# Patient Record
Sex: MALE | Race: WHITE | NOT HISPANIC OR LATINO | URBAN - METROPOLITAN AREA
[De-identification: names, ages, dates, MRNs, and addresses within clinical notes are randomized per-mention and may not be internally consistent; named-entity substitution may affect disease eponyms.]

---

## 2022-12-26 ENCOUNTER — INPATIENT (INPATIENT)
Facility: HOSPITAL | Age: 62
LOS: 2 days | Discharge: HOME | DRG: 866 | End: 2022-12-29
Attending: INTERNAL MEDICINE | Admitting: INTERNAL MEDICINE
Payer: COMMERCIAL

## 2022-12-26 VITALS
OXYGEN SATURATION: 96 % | RESPIRATION RATE: 22 BRPM | TEMPERATURE: 100 F | SYSTOLIC BLOOD PRESSURE: 134 MMHG | HEIGHT: 68 IN | DIASTOLIC BLOOD PRESSURE: 86 MMHG | WEIGHT: 167.99 LBS | HEART RATE: 112 BPM

## 2022-12-26 DIAGNOSIS — R50.9 FEVER, UNSPECIFIED: ICD-10-CM

## 2022-12-26 LAB
ALBUMIN SERPL ELPH-MCNC: 3.2 G/DL — LOW (ref 3.3–5)
ALP SERPL-CCNC: 190 U/L — HIGH (ref 30–120)
ALT FLD-CCNC: 155 U/L DA — HIGH (ref 10–60)
ANION GAP SERPL CALC-SCNC: 11 MMOL/L — SIGNIFICANT CHANGE UP (ref 5–17)
ANISOCYTOSIS BLD QL: SLIGHT — SIGNIFICANT CHANGE UP
APPEARANCE UR: CLEAR — SIGNIFICANT CHANGE UP
APTT BLD: 41.6 SEC — HIGH (ref 27.5–35.5)
AST SERPL-CCNC: 68 U/L — HIGH (ref 10–40)
BACTERIA # UR AUTO: ABNORMAL
BASOPHILS # BLD AUTO: 0.09 K/UL — SIGNIFICANT CHANGE UP (ref 0–0.2)
BASOPHILS NFR BLD AUTO: 1 % — SIGNIFICANT CHANGE UP (ref 0–2)
BILIRUB SERPL-MCNC: 0.6 MG/DL — SIGNIFICANT CHANGE UP (ref 0.2–1.2)
BILIRUB UR-MCNC: NEGATIVE — SIGNIFICANT CHANGE UP
BUN SERPL-MCNC: 24 MG/DL — HIGH (ref 7–23)
CALCIUM SERPL-MCNC: 8.6 MG/DL — SIGNIFICANT CHANGE UP (ref 8.4–10.5)
CHLORIDE SERPL-SCNC: 100 MMOL/L — SIGNIFICANT CHANGE UP (ref 96–108)
CO2 SERPL-SCNC: 26 MMOL/L — SIGNIFICANT CHANGE UP (ref 22–31)
COLOR SPEC: YELLOW — SIGNIFICANT CHANGE UP
CREAT SERPL-MCNC: 1.39 MG/DL — HIGH (ref 0.5–1.3)
DIFF PNL FLD: NEGATIVE — SIGNIFICANT CHANGE UP
EGFR: 57 ML/MIN/1.73M2 — LOW
EOSINOPHIL # BLD AUTO: 1.3 K/UL — HIGH (ref 0–0.5)
EOSINOPHIL NFR BLD AUTO: 14 % — HIGH (ref 0–6)
EPI CELLS # UR: SIGNIFICANT CHANGE UP
FLUAV AG NPH QL: SIGNIFICANT CHANGE UP
FLUBV AG NPH QL: SIGNIFICANT CHANGE UP
GLUCOSE SERPL-MCNC: 108 MG/DL — HIGH (ref 70–99)
GLUCOSE UR QL: NEGATIVE MG/DL — SIGNIFICANT CHANGE UP
HCT VFR BLD CALC: 39.8 % — SIGNIFICANT CHANGE UP (ref 39–50)
HGB BLD-MCNC: 13.2 G/DL — SIGNIFICANT CHANGE UP (ref 13–17)
INR BLD: 1.42 RATIO — HIGH (ref 0.88–1.16)
KETONES UR-MCNC: ABNORMAL
LACTATE SERPL-SCNC: 1.7 MMOL/L — SIGNIFICANT CHANGE UP (ref 0.7–2)
LEUKOCYTE ESTERASE UR-ACNC: ABNORMAL
LYMPHOCYTES # BLD AUTO: 0.46 K/UL — LOW (ref 1–3.3)
LYMPHOCYTES # BLD AUTO: 5 % — LOW (ref 13–44)
MANUAL SMEAR VERIFICATION: SIGNIFICANT CHANGE UP
MCHC RBC-ENTMCNC: 28 PG — SIGNIFICANT CHANGE UP (ref 27–34)
MCHC RBC-ENTMCNC: 33.2 GM/DL — SIGNIFICANT CHANGE UP (ref 32–36)
MCV RBC AUTO: 84.5 FL — SIGNIFICANT CHANGE UP (ref 80–100)
MONOCYTES # BLD AUTO: 0.46 K/UL — SIGNIFICANT CHANGE UP (ref 0–0.9)
MONOCYTES NFR BLD AUTO: 5 % — SIGNIFICANT CHANGE UP (ref 2–14)
NEUTROPHILS # BLD AUTO: 5.01 K/UL — SIGNIFICANT CHANGE UP (ref 1.8–7.4)
NEUTROPHILS NFR BLD AUTO: 51 % — SIGNIFICANT CHANGE UP (ref 43–77)
NEUTS BAND # BLD: 3 % — SIGNIFICANT CHANGE UP (ref 0–8)
NITRITE UR-MCNC: NEGATIVE — SIGNIFICANT CHANGE UP
NRBC # BLD: 0 — SIGNIFICANT CHANGE UP
NRBC # BLD: SIGNIFICANT CHANGE UP /100 WBCS (ref 0–0)
PH UR: 5 — SIGNIFICANT CHANGE UP (ref 5–8)
PLAT MORPH BLD: NORMAL — SIGNIFICANT CHANGE UP
PLATELET # BLD AUTO: 145 K/UL — LOW (ref 150–400)
POTASSIUM SERPL-MCNC: 4.5 MMOL/L — SIGNIFICANT CHANGE UP (ref 3.5–5.3)
POTASSIUM SERPL-SCNC: 4.5 MMOL/L — SIGNIFICANT CHANGE UP (ref 3.5–5.3)
PROT SERPL-MCNC: 7.7 G/DL — SIGNIFICANT CHANGE UP (ref 6–8.3)
PROT UR-MCNC: 30 MG/DL
PROTHROM AB SERPL-ACNC: 16.8 SEC — HIGH (ref 10.5–13.4)
RBC # BLD: 4.71 M/UL — SIGNIFICANT CHANGE UP (ref 4.2–5.8)
RBC # FLD: 15.9 % — HIGH (ref 10.3–14.5)
RBC BLD AUTO: ABNORMAL
RBC CASTS # UR COMP ASSIST: NEGATIVE /HPF — SIGNIFICANT CHANGE UP (ref 0–4)
RSV RNA NPH QL NAA+NON-PROBE: SIGNIFICANT CHANGE UP
SARS-COV-2 RNA SPEC QL NAA+PROBE: SIGNIFICANT CHANGE UP
SMUDGE CELLS # BLD: PRESENT — SIGNIFICANT CHANGE UP
SODIUM SERPL-SCNC: 137 MMOL/L — SIGNIFICANT CHANGE UP (ref 135–145)
SP GR SPEC: 1.02 — SIGNIFICANT CHANGE UP (ref 1.01–1.02)
UROBILINOGEN FLD QL: NEGATIVE MG/DL — SIGNIFICANT CHANGE UP
VARIANT LYMPHS # BLD: 21 % — HIGH (ref 0–6)
WBC # BLD: 9.27 K/UL — SIGNIFICANT CHANGE UP (ref 3.8–10.5)
WBC # FLD AUTO: 9.27 K/UL — SIGNIFICANT CHANGE UP (ref 3.8–10.5)
WBC UR QL: SIGNIFICANT CHANGE UP

## 2022-12-26 PROCEDURE — 99223 1ST HOSP IP/OBS HIGH 75: CPT

## 2022-12-26 PROCEDURE — 99285 EMERGENCY DEPT VISIT HI MDM: CPT

## 2022-12-26 PROCEDURE — 71260 CT THORAX DX C+: CPT | Mod: 26,MA

## 2022-12-26 PROCEDURE — 71045 X-RAY EXAM CHEST 1 VIEW: CPT | Mod: 26

## 2022-12-26 PROCEDURE — 76700 US EXAM ABDOM COMPLETE: CPT | Mod: 26

## 2022-12-26 PROCEDURE — 74177 CT ABD & PELVIS W/CONTRAST: CPT | Mod: 26,MA

## 2022-12-26 RX ORDER — IBUPROFEN 200 MG
400 TABLET ORAL ONCE
Refills: 0 | Status: COMPLETED | OUTPATIENT
Start: 2022-12-26 | End: 2022-12-26

## 2022-12-26 RX ORDER — SODIUM CHLORIDE 9 MG/ML
2400 INJECTION INTRAMUSCULAR; INTRAVENOUS; SUBCUTANEOUS ONCE
Refills: 0 | Status: COMPLETED | OUTPATIENT
Start: 2022-12-26 | End: 2022-12-26

## 2022-12-26 RX ORDER — IPRATROPIUM/ALBUTEROL SULFATE 18-103MCG
3 AEROSOL WITH ADAPTER (GRAM) INHALATION ONCE
Refills: 0 | Status: COMPLETED | OUTPATIENT
Start: 2022-12-26 | End: 2022-12-26

## 2022-12-26 RX ORDER — PANTOPRAZOLE SODIUM 20 MG/1
40 TABLET, DELAYED RELEASE ORAL
Refills: 0 | Status: DISCONTINUED | OUTPATIENT
Start: 2022-12-26 | End: 2022-12-29

## 2022-12-26 RX ORDER — INFLUENZA VIRUS VACCINE 15; 15; 15; 15 UG/.5ML; UG/.5ML; UG/.5ML; UG/.5ML
0.5 SUSPENSION INTRAMUSCULAR ONCE
Refills: 0 | Status: DISCONTINUED | OUTPATIENT
Start: 2022-12-26 | End: 2022-12-29

## 2022-12-26 RX ORDER — SODIUM CHLORIDE 9 MG/ML
1000 INJECTION INTRAMUSCULAR; INTRAVENOUS; SUBCUTANEOUS
Refills: 0 | Status: DISCONTINUED | OUTPATIENT
Start: 2022-12-26 | End: 2022-12-29

## 2022-12-26 RX ORDER — ATORVASTATIN CALCIUM 80 MG/1
1 TABLET, FILM COATED ORAL
Qty: 0 | Refills: 0 | DISCHARGE

## 2022-12-26 RX ORDER — LOSARTAN POTASSIUM 100 MG/1
50 TABLET, FILM COATED ORAL DAILY
Refills: 0 | Status: DISCONTINUED | OUTPATIENT
Start: 2022-12-26 | End: 2022-12-29

## 2022-12-26 RX ORDER — LOSARTAN POTASSIUM 100 MG/1
1 TABLET, FILM COATED ORAL
Qty: 0 | Refills: 0 | DISCHARGE

## 2022-12-26 RX ORDER — ONDANSETRON 8 MG/1
4 TABLET, FILM COATED ORAL ONCE
Refills: 0 | Status: COMPLETED | OUTPATIENT
Start: 2022-12-26 | End: 2022-12-26

## 2022-12-26 RX ORDER — IBUPROFEN 200 MG
400 TABLET ORAL EVERY 8 HOURS
Refills: 0 | Status: DISCONTINUED | OUTPATIENT
Start: 2022-12-26 | End: 2022-12-26

## 2022-12-26 RX ADMIN — Medication 400 MILLIGRAM(S): at 20:30

## 2022-12-26 RX ADMIN — SODIUM CHLORIDE 2400 MILLILITER(S): 9 INJECTION INTRAMUSCULAR; INTRAVENOUS; SUBCUTANEOUS at 12:20

## 2022-12-26 RX ADMIN — Medication 400 MILLIGRAM(S): at 12:20

## 2022-12-26 RX ADMIN — SODIUM CHLORIDE 75 MILLILITER(S): 9 INJECTION INTRAMUSCULAR; INTRAVENOUS; SUBCUTANEOUS at 22:35

## 2022-12-26 RX ADMIN — SODIUM CHLORIDE 2400 MILLILITER(S): 9 INJECTION INTRAMUSCULAR; INTRAVENOUS; SUBCUTANEOUS at 11:22

## 2022-12-26 RX ADMIN — Medication 400 MILLIGRAM(S): at 19:30

## 2022-12-26 RX ADMIN — Medication 3 MILLILITER(S): at 12:03

## 2022-12-26 RX ADMIN — ONDANSETRON 4 MILLIGRAM(S): 8 TABLET, FILM COATED ORAL at 11:22

## 2022-12-26 RX ADMIN — Medication 125 MILLIGRAM(S): at 11:37

## 2022-12-26 RX ADMIN — Medication 400 MILLIGRAM(S): at 11:22

## 2022-12-26 NOTE — PATIENT PROFILE ADULT - FALL HARM RISK - HARM RISK INTERVENTIONS

## 2022-12-26 NOTE — H&P ADULT - HISTORY OF PRESENT ILLNESS
62 yr old with PMH of HTN ,h/o appendectomy presenting with fevers, body aches, diarrhea, hasf 6 days of fever (T-max 102) headache body aches coughing status post trip to [place].  Patient relates went to urgent care 3 days ago had a negative flu and COVID rapid test but was given Tamiflu anyway in case he still had the flu despite the negative test.  Patient developed nausea and some vomiting after starting Tamiflu he called his doctor yesterday they started him on doxycycline in case he had a bacterial infection.  No sore throat runny nose abdominal pain chest pain short of breath urinary complaints rash neck stiffness or photophobia. Last Tylenol approximately 1.5 hours prior to arrival  .PMD Attard   In ED febrile, with tachycadia, tachypnea, no leucocytosis, has lymphopenia,elevated LFT, had US abd -diffuse GB edema and periportal adenopathy,seen by surg- has elevated bun creat , had CT abd chest-< from: CT Chest w/ IV Cont (12.26.22 @ 17:01) >  Diffuse thoracic and abdominopelvic lymphadenopathy. Mild splenomegaly.   Consideration is given to a infectious/inflammatory process versus a   neoplastic process such as lymphoproliferative disease.  Mild hepatomegaly. Periportal edema, a nonspecific finding that may be   seen with hepatitis or aggressive fluid resuscitation. Correlate with   liver function tests.  Underdistended gallbladder. Edematous gallbladder wall thickening, also   nonspecific and may be related to underlying liver pathology.  Gastric wall thickening versus underdistention, possibly representing   gastritis. Consider correlation with upper endoscopy to assess for   underlying neoplasm.  A few small nonspecific pulmonary nodules. Continued follow-up is   recommended.  Small right and trace left pleural effusions.    Small volume ascites.    Being admitted for further management  ID GI , hemeonc and pulmonary opinion sought

## 2022-12-26 NOTE — ED PROVIDER NOTE - LAB INTERPRETATION
Flu With COVID-19 By ODESSA (12.26.22 @ 11:34)   SARS-CoV-2 Result: NotDetec: This Respiratory Panel uses polymerase chain reaction (PCR) to detect for   influenza A; influenza B; respiratory syncytial virus; and SARS-CoV-2.   Influenza A Result: NotDetec   Influenza B Result: NotDetec   Resp Syn Virus Result: NotDetec

## 2022-12-26 NOTE — ED PROVIDER NOTE - PROGRESS NOTE DETAILS
d/w oz (surg) she requests CT and she will see pt d/w shaheen (med) she will admit pt, she requests bre (gi) and marguerite (heme/onc) consults be called. d/w bre and marguerite, they will follow pt

## 2022-12-26 NOTE — ED PROVIDER NOTE - CLINICAL SUMMARY MEDICAL DECISION MAKING FREE TEXT BOX
Patient complaining of 6 days of fever (T-max 102) headache body aches coughing status post trip to [place].  Patient relates went to urgent care 3 days ago had a negative flu and COVID rapid test but was given Tamiflu anyway in case he still had the flu despite the negative test.  Patient developed nausea and some vomiting after starting Tamiflu he called his doctor yesterday they started him on doxycycline in case he had a bacterial infection.  No sore throat runny nose abdominal pain chest pain short of breath urinary complaints rash neck stiffness or photophobia.    Plan sepsis work-up including EKG chest x-ray labs lactate cultures IV fluid Motrin for fever Zofran for nausea duo nebs and Solu-Medrol for wheezing  Differential including Flu COVID RSV pneumonia sepsis

## 2022-12-26 NOTE — ED PROVIDER NOTE - OBJECTIVE STATEMENT
Patient complaining of 6 days of fever (T-max 102) headache body aches coughing status post trip to [place].  Patient relates went to urgent care 3 days ago had a negative flu and COVID rapid test but was given Tamiflu anyway in case he still had the flu despite the negative test.  Patient developed nausea and some vomiting after starting Tamiflu he called his doctor yesterday they started him on doxycycline in case he had a bacterial infection.  No sore throat runny nose abdominal pain chest pain short of breath urinary complaints rash neck stiffness or photophobia. Last Tylenol approximately 1.5 hours prior to arrival.  PMD Attard

## 2022-12-26 NOTE — ED ADULT NURSE NOTE - OBJECTIVE STATEMENT
63yo male walked into Ed, pt c/o "lisbeth had fevers for a few days now" as per pt. pt advised staff he visited family in [place] on Thursday 12/22/22 when symptoms began. pt denies SOB, pain.

## 2022-12-26 NOTE — ED ADULT TRIAGE NOTE - CHIEF COMPLAINT QUOTE
fever and headache came home from [place] after seeing grand kids. seen at urgent care and covid and flu negative but started on tamiflu and then called covering md yesterday and started on doxycycline over phone

## 2022-12-26 NOTE — ED PROVIDER NOTE - ENMT, MLM
Airway patent,Mouth with normal mucosa. Throat has no vesicles, no oropharyngeal exudates and uvula is midline. no nuchal rigidity

## 2022-12-27 LAB
ALBUMIN SERPL ELPH-MCNC: 2.7 G/DL — LOW (ref 3.3–5)
ALP SERPL-CCNC: 150 U/L — HIGH (ref 30–120)
ALT FLD-CCNC: 112 U/L DA — HIGH (ref 10–60)
ANION GAP SERPL CALC-SCNC: 6 MMOL/L — SIGNIFICANT CHANGE UP (ref 5–17)
AST SERPL-CCNC: 37 U/L — SIGNIFICANT CHANGE UP (ref 10–40)
BASOPHILS # BLD AUTO: 0.08 K/UL — SIGNIFICANT CHANGE UP (ref 0–0.2)
BASOPHILS NFR BLD AUTO: 0.7 % — SIGNIFICANT CHANGE UP (ref 0–2)
BILIRUB SERPL-MCNC: 0.3 MG/DL — SIGNIFICANT CHANGE UP (ref 0.2–1.2)
BUN SERPL-MCNC: 26 MG/DL — HIGH (ref 7–23)
CALCIUM SERPL-MCNC: 8.3 MG/DL — LOW (ref 8.4–10.5)
CEA SERPL-MCNC: <0.6 NG/ML — SIGNIFICANT CHANGE UP (ref 0–3.8)
CHLORIDE SERPL-SCNC: 106 MMOL/L — SIGNIFICANT CHANGE UP (ref 96–108)
CO2 SERPL-SCNC: 27 MMOL/L — SIGNIFICANT CHANGE UP (ref 22–31)
CREAT SERPL-MCNC: 0.89 MG/DL — SIGNIFICANT CHANGE UP (ref 0.5–1.3)
CRP SERPL-MCNC: 109 MG/L — HIGH
EGFR: 97 ML/MIN/1.73M2 — SIGNIFICANT CHANGE UP
EOSINOPHIL # BLD AUTO: 0.04 K/UL — SIGNIFICANT CHANGE UP (ref 0–0.5)
EOSINOPHIL NFR BLD AUTO: 0.4 % — SIGNIFICANT CHANGE UP (ref 0–6)
ERYTHROCYTE [SEDIMENTATION RATE] IN BLOOD: 74 MM/HR — HIGH (ref 0–9)
GLUCOSE SERPL-MCNC: 132 MG/DL — HIGH (ref 70–99)
HCT VFR BLD CALC: 34 % — LOW (ref 39–50)
HGB BLD-MCNC: 11.4 G/DL — LOW (ref 13–17)
IMM GRANULOCYTES NFR BLD AUTO: 0.5 % — SIGNIFICANT CHANGE UP (ref 0–0.9)
LDH SERPL L TO P-CCNC: 197 U/L — SIGNIFICANT CHANGE UP (ref 50–242)
LYMPHOCYTES # BLD AUTO: 2.39 K/UL — SIGNIFICANT CHANGE UP (ref 1–3.3)
LYMPHOCYTES # BLD AUTO: 21.8 % — SIGNIFICANT CHANGE UP (ref 13–44)
MCHC RBC-ENTMCNC: 28.3 PG — SIGNIFICANT CHANGE UP (ref 27–34)
MCHC RBC-ENTMCNC: 33.5 GM/DL — SIGNIFICANT CHANGE UP (ref 32–36)
MCV RBC AUTO: 84.4 FL — SIGNIFICANT CHANGE UP (ref 80–100)
MONOCYTES # BLD AUTO: 1.38 K/UL — HIGH (ref 0–0.9)
MONOCYTES NFR BLD AUTO: 12.6 % — SIGNIFICANT CHANGE UP (ref 2–14)
NEUTROPHILS # BLD AUTO: 7 K/UL — SIGNIFICANT CHANGE UP (ref 1.8–7.4)
NEUTROPHILS NFR BLD AUTO: 64 % — SIGNIFICANT CHANGE UP (ref 43–77)
NRBC # BLD: 0 /100 WBCS — SIGNIFICANT CHANGE UP (ref 0–0)
PLATELET # BLD AUTO: 167 K/UL — SIGNIFICANT CHANGE UP (ref 150–400)
POTASSIUM SERPL-MCNC: 4.8 MMOL/L — SIGNIFICANT CHANGE UP (ref 3.5–5.3)
POTASSIUM SERPL-SCNC: 4.8 MMOL/L — SIGNIFICANT CHANGE UP (ref 3.5–5.3)
PROT SERPL-MCNC: 7.1 G/DL — SIGNIFICANT CHANGE UP (ref 6–8.3)
RBC # BLD: 4.03 M/UL — LOW (ref 4.2–5.8)
RBC # FLD: 16.2 % — HIGH (ref 10.3–14.5)
SODIUM SERPL-SCNC: 139 MMOL/L — SIGNIFICANT CHANGE UP (ref 135–145)
T3 SERPL-MCNC: 84 NG/DL — SIGNIFICANT CHANGE UP (ref 80–200)
T4 AB SER-ACNC: 5.3 UG/DL — SIGNIFICANT CHANGE UP (ref 4.6–12)
TSH SERPL-MCNC: 2.67 UIU/ML — SIGNIFICANT CHANGE UP (ref 0.27–4.2)
WBC # BLD: 10.95 K/UL — HIGH (ref 3.8–10.5)
WBC # FLD AUTO: 10.95 K/UL — HIGH (ref 3.8–10.5)

## 2022-12-27 PROCEDURE — 99233 SBSQ HOSP IP/OBS HIGH 50: CPT

## 2022-12-27 RX ORDER — ACETAMINOPHEN 500 MG
650 TABLET ORAL EVERY 6 HOURS
Refills: 0 | Status: DISCONTINUED | OUTPATIENT
Start: 2022-12-27 | End: 2022-12-29

## 2022-12-27 RX ORDER — LANOLIN ALCOHOL/MO/W.PET/CERES
5 CREAM (GRAM) TOPICAL AT BEDTIME
Refills: 0 | Status: DISCONTINUED | OUTPATIENT
Start: 2022-12-27 | End: 2022-12-29

## 2022-12-27 RX ORDER — HEPARIN SODIUM 5000 [USP'U]/ML
5000 INJECTION INTRAVENOUS; SUBCUTANEOUS EVERY 12 HOURS
Refills: 0 | Status: DISCONTINUED | OUTPATIENT
Start: 2022-12-27 | End: 2022-12-29

## 2022-12-27 RX ORDER — IBUPROFEN 200 MG
400 TABLET ORAL EVERY 8 HOURS
Refills: 0 | Status: DISCONTINUED | OUTPATIENT
Start: 2022-12-27 | End: 2022-12-29

## 2022-12-27 RX ADMIN — Medication 650 MILLIGRAM(S): at 23:15

## 2022-12-27 RX ADMIN — Medication 650 MILLIGRAM(S): at 15:28

## 2022-12-27 RX ADMIN — PANTOPRAZOLE SODIUM 40 MILLIGRAM(S): 20 TABLET, DELAYED RELEASE ORAL at 06:58

## 2022-12-27 RX ADMIN — Medication 400 MILLIGRAM(S): at 08:36

## 2022-12-27 RX ADMIN — HEPARIN SODIUM 5000 UNIT(S): 5000 INJECTION INTRAVENOUS; SUBCUTANEOUS at 18:03

## 2022-12-27 RX ADMIN — Medication 400 MILLIGRAM(S): at 23:15

## 2022-12-27 RX ADMIN — Medication 100 MILLIGRAM(S): at 20:36

## 2022-12-27 RX ADMIN — Medication 400 MILLIGRAM(S): at 09:25

## 2022-12-27 RX ADMIN — Medication 650 MILLIGRAM(S): at 15:04

## 2022-12-27 RX ADMIN — SODIUM CHLORIDE 75 MILLILITER(S): 9 INJECTION INTRAMUSCULAR; INTRAVENOUS; SUBCUTANEOUS at 12:34

## 2022-12-27 NOTE — PROGRESS NOTE ADULT - NS ATTEND AMEND GEN_ALL_CORE FT
I have personally seen and examined the patient.  I fully participated in the care of this patient.  I have made amendments to the documentation where necessary, and agree with the history, physical exam, impression/assessment, and plan as documented by the PA    consulted by ED for gallbladder wall edema, which is a nonspecific finding. Patient also has periportal edema  no clinical concern for acute gallbladder pathology, more likely GI infection  consider hepatitis workup   stool studies  GI and ID f/u

## 2022-12-27 NOTE — CONSULT NOTE ADULT - SUBJECTIVE AND OBJECTIVE BOX
General Surgery Consult    HPI:  62M with HTN, HLD, h/o appendectomy presenting with fevers, body aches, diarrhea. Patient reports he was in [place] last week visiting family. Went to a restaurant and ordered fish. That night developed diffuse body rash that dissipated. No h/o fish allergy but tried a new type of fish. The next day he began having fevers have been  persistent Tmax 102 at home. Went to urgent case, negative for flu or covid but was given Tamiflu incase. Called his pcp when it continued and was called in doxycycline yesterday. Has been having watery diarrhea for 2 days (prior to starting abx), no blood per patient. No RUQ pain, nausea/vomiting    In ED labs were noted to have elevated LFTs so RUQ US was ordered which showed no gallstones but GB wall edema. Covid / flu negative.    h/o colonoscopy 10 yrs ago reportedly normal. Per patient he has a history of elevated LFTs in the past       PAST MEDICAL HISTORY:  HTN  HLD    PAST SURGICAL HISTORY:   appendectomy    MEDICATIONS:  losartan  atorvastatin  Tamiflu  doxycyline     ALLERGIES:  Vicodin (Hives)      VITALS & I/Os:  Vital Signs Last 24 Hrs  T(C): 38.9 (26 Dec 2022 12:20), Max: 39.4 (26 Dec 2022 11:55)  T(F): 102 (26 Dec 2022 12:20), Max: 103 (26 Dec 2022 11:55)  HR: 105 (26 Dec 2022 12:20) (95 - 112)  BP: 119/85 (26 Dec 2022 12:20) (119/85 - 134/86)  BP(mean): --  RR: 16 (26 Dec 2022 12:20) (16 - 22)  SpO2: 96% (26 Dec 2022 12:20) (96% - 97%)    Parameters below as of 26 Dec 2022 12:20  Patient On (Oxygen Delivery Method): room air        I&O's Summary      PHYSICAL EXAM:  Constitutional: No acute distress, resting comfortably  Neuro: Awake and alert  Psych: Calm, affect appropriate  HEENT: Anicteric, no conjunctival injection  Respiratory: Nonlabored, equal chest rise  Cardiovascular: Regular rate and rhythm  GI: Soft, +distended, nontender, no RUQ tenderness  Musculoskeletal: Warm, no edema, no obvious deformity      LABS:                        13.2   9.27  )-----------( 145      ( 26 Dec 2022 11:34 )             39.8         137  |  100  |  24<H>  ----------------------------<  108<H>  4.5   |  26  |  1.39<H>    Ca    8.6      26 Dec 2022 11:34    TPro  7.7  /  Alb  3.2<L>  /  TBili  0.6  /  DBili  x   /  AST  68<H>  /  ALT  155<H>  /  AlkPhos  190<H>      Lactate: Lactate, Blood: 1.7 mmol/L ( @ 11:34)     PT/INR - ( 26 Dec 2022 11:34 )   PT: 16.8 sec;   INR: 1.42 ratio         PTT - ( 26 Dec 2022 11:34 )  PTT:41.6 sec          Urinalysis Basic - ( 26 Dec 2022 13:20 )    Color: Yellow / Appearance: Clear / S.020 / pH: x  Gluc: x / Ketone: Moderate  / Bili: Negative / Urobili: Negative mg/dL   Blood: x / Protein: 30 mg/dL / Nitrite: Negative   Leuk Esterase: Trace / RBC: Negative /HPF / WBC 0-2   Sq Epi: x / Non Sq Epi: Occasional / Bacteria: Few        IMAGING:  c< from: US Abdomen Complete (US Abdomen Complete .) (22 @ 14:17) >    ACC: [id number] EXAM:  US ABDOMEN COMPLETE                          PROCEDURE DATE:  2022          INTERPRETATION:  CLINICAL INFORMATION: Abnormal liver function tests.    COMPARISON: None available.    TECHNIQUE: Sonography of the abdomen.    FINDINGS:  Liver: Normal in size and echogenicity. No focal lesions are identified.  Bile ducts: Normal caliber. Common bile duct measures .  Gallbladder: Diffuse gallbladder wall edema measuring up to 9 mm. No   stones are identified. There is no sonographic Hardin sign. Findings are   nonspecific.  Pancreas: There is 6 mm cyst in the region of the body of the pancreas.   The rest of the visualized portion of the pancreas is unremarkable.  Spleen: 9.8 cm. Within normal limits.  Right kidney: 9.6 cm. No hydronephrosis.  Left kidney: 10.1 cm.. No hydronephrosis.  Ascites: Trace amount of perihepatic fluid.  Aorta and IVC: Visualized portions are within normal limits.    Trace right pleural effusion.    There are multiple enlarged periportal lymph nodes measuring up to 4.5 x   1.9 cm.    IMPRESSION:  Diffuse gallbladder wall edema which is a nonspecific finding.    Periportal adenopathy.    Subcentimeter pancreatic body cyst.    Consider further evaluation with contrast enhanced dual phase abdominal   and pelvic CT.        --- End of Report ---            JOSELITO ROBERTS MD; Attending Radiologist  This document has been electronically signed. Dec 26 2022  3:22PM    < end of copied text >  
62M with HTN, HLD, h/o appendectomy presenting with fevers, body aches, diarrhea. Patient reports he was in [place] last week visiting family. Went to a restaurant and ordered fish. That night developed diffuse body rash that dissipated. No h/o fish allergy but tried a new type of fish. The next day he began having fevers have been  persistent Tmax 102 at home. Went to urgent case, negative for flu or covid but was given Tamiflu incase. Called his pcp when it continued and was called in doxycycline yesterday. Has been having watery diarrhea for 2 days (prior to starting abx), no blood per patient. No RUQ pain, nausea/vomiting    In ED labs were noted to have elevated LFTs so RUQ US was ordered which showed no gallstones but GB wall edema. Covid / flu negative.    h/o colonoscopy 10 yrs ago reportedly normal. Per patient he has a history of elevated LFTs in the past       PAST MEDICAL HISTORY:  HTN  HLD    PAST SURGICAL HISTORY:   appendectomy    MEDICATIONS:  losartan  atorvastatin  Tamiflu  doxycyline   VITALS & I/Os:  Vital Signs Last 24 Hrs  T(C): 38.9 (26 Dec 2022 12:20), Max: 39.4 (26 Dec 2022 11:55)  T(F): 102 (26 Dec 2022 12:20), Max: 103 (26 Dec 2022 11:55)  HR: 105 (26 Dec 2022 12:20) (95 - 112)  BP: 119/85 (26 Dec 2022 12:20) (119/85 - 134/86)  BP(mean): --  RR: 16 (26 Dec 2022 12:20) (16 - 22)  SpO2: 96% (26 Dec 2022 12:20) (96% - 97%)    Parameters below as of 26 Dec 2022 12:20  Patient On (Oxygen Delivery Method): room air        I&O's Summary      PHYSICAL EXAM:  Constitutional: No acute distress, resting comfortably  Neuro: Awake and alert  Psych: Calm, affect appropriate  HEENT: Anicteric, no conjunctival injection  Respiratory: Nonlabored, equal chest rise  Cardiovascular: Regular rate and rhythm  GI: Soft, +distended, nontender, no RUQ tenderness  Musculoskeletal: Warm, no edema, no obvious deformity      LABS:                        13.2   9.27  )-----------( 145      ( 26 Dec 2022 11:34 )             39.8         137  |  100  |  24<H>  ----------------------------<  108<H>  4.5   |  26  |  1.39<H>    Ca    8.6      26 Dec 2022 11:34    TPro  7.7  /  Alb  3.2<L>  /  TBili  0.6  /  DBili  x   /  AST  68<H>  /  ALT  155<H>  /  AlkPhos  190<H>      Lactate: Lactate, Blood: 1.7 mmol/L ( @ 11:34)     PT/INR - ( 26 Dec 2022 11:34 )   PT: 16.8 sec;   INR: 1.42 ratio         PTT - ( 26 Dec 2022 11:34 )  PTT:41.6 sec          Urinalysis Basic - ( 26 Dec 2022 13:20 )    Color: Yellow / Appearance: Clear / S.020 / pH: x  Gluc: x / Ketone: Moderate  / Bili: Negative / Urobili: Negative mg/dL   Blood: x / Protein: 30 mg/dL / Nitrite: Negative   Leuk Esterase: Trace / RBC: Negative /HPF / WBC 0-2   Sq Epi: x / Non Sq Epi: Occasional / Bacteria: Few        IMAGING:  c< from: US Abdomen Complete (US Abdomen Complete .) (22 @ 14:17) >    ACC: [id number] EXAM:  US ABDOMEN COMPLETE                          PROCEDURE DATE:  2022          INTERPRETATION:  CLINICAL INFORMATION: Abnormal liver function tests.    COMPARISON: None available.    TECHNIQUE: Sonography of the abdomen.    FINDINGS:  Liver: Normal in size and echogenicity. No focal lesions are identified.  Bile ducts: Normal caliber. Common bile duct measures .  Gallbladder: Diffuse gallbladder wall edema measuring up to 9 mm. No   stones are identified. There is no sonographic Hardin sign. Findings are   nonspecific.  Pancreas: There is 6 mm cyst in the region of the body of the pancreas.   The rest of the visualized portion of the pancreas is unremarkable.  Spleen: 9.8 cm. Within normal limits.  Right kidney: 9.6 cm. No hydronephrosis.  Left kidney: 10.1 cm.. No hydronephrosis.  Ascites: Trace amount of perihepatic fluid.
NEPHROLOGY CONSULTATION    CHIEF COMPLAINT:  CLAYTON    HPI:  Presents with several days of high fever, headache, cough, and transient diarrhea.  He had mild azotemia yesterday which quickly resolved with hydration.  He denies any hx of CKD and has HTN.        ROS:  denies hematuria, voiding problems    PAST MEDICAL & SURGICAL HISTORY:  HTN  HPL  Appendectomy      SOCIAL HISTORY:  NA    FAMILY HISTORY:  NA      MEDICATIONS  (STANDING):  influenza   Vaccine 0.5 milliLiter(s) IntraMuscular once  losartan 50 milliGRAM(s) Oral daily  pantoprazole    Tablet 40 milliGRAM(s) Oral before breakfast  sodium chloride 0.9%. 1000 milliLiter(s) (75 mL/Hr) IV Continuous <Continuous>      PHYSICAL EXAMINATION:  T(F): 97.6 (22 @ 11:59)  HR: 74 (22 @ 11:59)  BP: 100/57 (22 @ 11:59)  RR: 17 (22 @ 11:59)  SpO2: 94% (22 @ 11:59)  Conversant, no apparent distress  PERRLA, pink conjunctivae, no ptosis  Good dentition, no pharyngeal erythema  Neck non tender, no mass, no thyromegaly or nodules  Normal respiratory effort, lungs clear to auscultation  Heart with RRR, no murmurs or rubs, no peripheral edema  Abdomen soft, no masses, no organomegaly  Skin no rashes, ulcers or lesions, normal turgor and temperature  Appropriate affect, AO x 3    LABS:                        11.4   10.95 )-----------( 167      ( 27 Dec 2022 07:33 )             34.0         139  |  106  |  26<H>  ----------------------------<  132<H>  4.8   |  27  |  0.89    Ca    8.3<L>      27 Dec 2022 07:33    TPro  7.1  /  Alb  2.7<L>  /  TBili  0.3  /  DBili  x   /  AST  37  /  ALT  112<H>  /  AlkPhos  150<H>      Urinalysis Basic - ( 26 Dec 2022 13:20 )  Color: Yellow / Appearance: Clear / S.020 / pH: x  Gluc: x / Ketone: Moderate  / Bili: Negative / Urobili: Negative mg/dL   Blood: x / Protein: 30 mg/dL / Nitrite: Negative   Leuk Esterase: Trace / RBC: Negative /HPF / WBC 0-2   Sq Epi: x / Non Sq Epi: Occasional / Bacteria: Few        RADIOLOGY:  CT scan shows thoraco abdominal adenopathy, hepatosplenomegaly;  kidneys look normal       ASSESSMENT:  1.  CLAYTON - prerenal azotemia - improved  2.  Fever of unknown origin in association with diffuse adenopathy must rule out lymphoma    PLAN:  Continue IVF today  BMP in AM            
All records reviewed.  PMD Dr Farzad Mendez    HPI:  61 y/o man with PMH of HTN ,h/o appendectomy. Recently visited family in [place], reports ate out last Wed, dev hives, returned to  on Thurs, dev fever uo to 102. Also w malaises poor po, cough w clear fluid. Was seen in [facility] Friday, COVID and Flu tests negative but given Temaflu, also took antibiotics called in by covering PMD and had diarrhea(resolved), then came to ER      In ED febrile, with tachycadia, tachypnea, no leucocytosis, a,elevated LFT, elevated BUN/Cr  -US abd -diffuse GB edema and periportal adenopathy   CT abd chest-< from: CT Chest w/ IV Cont (22 @ 17:01) >  Diffuse thoracic and abdominopelvic lymphadenopathy. Mild splenomegaly.   Consideration is given to a infectious/inflammatory process versus a   neoplastic process such as lymphoproliferative disease.  Mild hepatomegaly. Periportal edema, a nonspecific finding that may be   seen with hepatitis or aggressive fluid resuscitation. Correlate with   liver function tests.  Underdistended gallbladder. Edematous gallbladder wall thickening, also   nonspecific and may be related to underlying liver pathology.  Gastric wall thickening versus underdistention, possibly representing   gastritis. Consider correlation with upper endoscopy to assess for   underlying neoplasm.  A few small nonspecific pulmonary nodules. Continued follow-up is   recommended.  Small right and trace left pleural effusions.  Small volume ascites.    Today no longer w fever and feeling improved  No prior similar hx in past      PAST MEDICAL & SURGICAL HISTORY:      Review of System:  see above  no sweats, anorexia weight loss    MEDICATIONS  (STANDING):  influenza   Vaccine 0.5 milliLiter(s) IntraMuscular once  losartan 50 milliGRAM(s) Oral daily  pantoprazole    Tablet 40 milliGRAM(s) Oral before breakfast  sodium chloride 0.9%. 1000 milliLiter(s) (75 mL/Hr) IV Continuous <Continuous>    MEDICATIONS  (PRN):  acetaminophen     Tablet .. 650 milliGRAM(s) Oral every 6 hours PRN Mild Pain (1 - 3)  ibuprofen  Tablet. 400 milliGRAM(s) Oral every 8 hours PRN Mild Pain (1 - 3)      Allergies    Vicodin (Hives)    Intolerances        SOCIAL HISTORY:  never smoker  social Etoh    FAMILY HISTORY:  no malignancy  both parents lived to age 90s      Vital Signs Last 24 Hrs  T(C): 36.4 (27 Dec 2022 11:59), Max: 37.4 (26 Dec 2022 16:00)  T(F): 97.6 (27 Dec 2022 11:59), Max: 99.3 (26 Dec 2022 16:00)  HR: 74 (27 Dec 2022 11:59) (74 - 90)  BP: 100/57 (27 Dec 2022 11:59) (95/56 - 114/65)  BP(mean): --  RR: 17 (27 Dec 2022 11:59) (15 - 18)  SpO2: 94% (27 Dec 2022 11:59) (94% - 97%)    Parameters below as of 27 Dec 2022 11:59  Patient On (Oxygen Delivery Method): room air        PHYSICAL EXAM:      General:well developed well nourished, in no acute distress  Eyes:sclera anicteric, pupils equal and reactive to light  ENMT:buccal mucosa moist, pharynx not injected  Neck:supple, trachea midline  Lungs:clear, no wheeze/rhonchi  Cardiovascular:regular rate and rhythm, S1 S2  Abdomen:soft, nontender, no organomegaly present, bowel sounds normal. Pouchy  Neurological:alert and oriented x3, Cranial Nerves II-XII grossly intact  Skin:no increased ecchymosis/petechiae/purpura  Lymph Nodes:no palpable cervical/supraclavicular/axilary lymph nodes enlargements  Extremities:no cyanosis/clubbing/edema        LABS:                        11.4   10.95 )-----------( 167      ( 27 Dec 2022 07:33 )             34.0      @ 07:33  WBC10.95  RBC4.03 Hgb11.4 Hct34.0  MCV84.4  Zveg395  Auto Jtdfzc11.0 Band-- Auto Lymph21.8 Atypical Lymph-- Reactive Lymph-- Auto Mono12.6 Auto Eos0.4 Auto Baso0.7        12 @ 11:34  WBC9.27  RBC4.71 Hgb13.2 Hct39.8  MCV84.5  Uwsw542  Auto Xllllf17.0 Band3.0 Auto Lymph5.0 Atypical Lymph-- Reactive Lymph21.0 Auto Mono5.0 Auto Eos14.0 Auto Baso1.0              139  |  106  |  26<H>  ----------------------------<  132<H>  4.8   |  27  |  0.89    Ca    8.3<L>      27 Dec 2022 07:33    TPro  7.1  /  Alb  2.7<L>  /  TBili  0.3  /  DBili  x   /  AST  37  /  ALT  112<H>  /  AlkPhos  150<H>   @ 11:34  PT16.8 INR1.42  PTT41.6    Urinalysis Basic - ( 26 Dec 2022 13:20 )    Color: Yellow / Appearance: Clear / S.020 / pH: x  Gluc: x / Ketone: Moderate  / Bili: Negative / Urobili: Negative mg/dL   Blood: x / Protein: 30 mg/dL / Nitrite: Negative   Leuk Esterase: Trace / RBC: Negative /HPF / WBC 0-2   Sq Epi: x / Non Sq Epi: Occasional / Bacteria: Few        PERIPHERAL BLOOD SMEAR REVIEW:      RADIOLOGY & ADDITIONAL STUDIES:    < from: CT Chest w/ IV Cont (22 @ 17:01) >  ACC: [id number] EXAM:  CT ABDOMEN AND PELVIS IC                        ACC: [id number] EXAM:  CT CHEST IC                          PROCEDURE DATE:  2022          INTERPRETATION:  CLINICAL INFORMATION: Fever, cough, nausea, and vomiting.    COMPARISON: Abdominal ultrasound 2022.    CONTRAST/COMPLICATIONS:  IV Contrast: Omnipaque 350  90 cc administered   10 cc discarded  Oral Contrast: NONE  Complications: None reported at time of study completion    PROCEDURE:  CT of the Chest, Abdomen and Pelvis was performed.  Sagittal and coronal reformats were performed.    FINDINGS:  CHEST:  LUNGS AND LARGE AIRWAYS: Patent central airways. There is a 7 mm nodule   along the minor fissure, likely representing an intrapulmonary lymph node   (series 5 image 259). Right lower lobe nodule measuring 3 mm (series 5   image 362). Left lower lobe nodule measuring 5 mm (series 5 image 398).   Subsegmental atelectatic changes in the bilateral lower lobes and lingula.  PLEURA: Small right and trace left pleural effusions. No pneumothorax.  VESSELS: Within normal limits.  HEART: Heart size is normal. No pericardial effusion.  MEDIASTINUM, MELANIE, AND LYMPH NODES: Bilateral axillary, bilateral hilar,   and mediastinal lymphadenopathy. Prominent lower cervical chain lymph   nodes bilaterally. Examples are as follows:  *  Left axillary lymph node measuring 2.7 x 1.8 cm in (series 3 image 17).  *  Right hilar lymph node measuring 1.6 x 1.1 cm (series 3 image 86).  *  Precarinal lymph node measuring 1.9 x 1.1 cm (series 3 image 63).  CHEST WALL AND LOWER NECK: Within normal limits.    ABDOMEN AND PELVIS:  LIVER: Mildly enlarged, measures 18.1 cm craniocaudally in the right   lobe. Subcentimeter hypodense focus in segment 5 that is too small to   characterize. Periportal edema. The hepatic and portal veins are patent.  BILE DUCTS: Normal caliber.  GALLBLADDER: Underdistended gallbladder with diffuse edematous wall   thickening.  SPLEEN: Mildly enlarged, measures 14.8 cm.  PANCREAS: Unremarkable appearance of the pancreas. The 6 mm cyst in the   region of the body pancreas described on the recent ultrasound is not   identified on the current exam. No pancreatic duct dilation.  ADRENALS: Within normal limits.  KIDNEYS/URETERS: Within normal limits.    BLADDER: Within normal limits.  REPRODUCTIVE ORGANS: Prostate within normal limits.    BOWEL: Gastric wall thickening, most prominently along the greater   curvature of the gastric body, possibly related to underdistention. No   bowel obstruction. The appendix is not visualized.  PERITONEUM: Small volume of free abdominopelvic fluid. No organized fluid   collection. No pneumoperitoneum.  VESSELS: Mild atherosclerotic changes.  RETROPERITONEUM/LYMPH NODES: Mild presacral edema. Retroperitoneal,   periportal, gastrohepatic, and peripancreatic lymphadenopathy, as well as   diffusely prominent mesenteric lymph nodes measuring up to 1 cm in short   axis, with examples as follows:  *  Portal caval lymph node measuring 4.3 x 2.2 cm (series 3image 163).  *  Gastrohepatic lymph node measuring 1.8 x 1.7 cm (series 3 image 151).  *  Left para-aortic lymph node measuring 2.1 x 1.7 cm (series 3 image   195).  ABDOMINAL WALL: Within normal limits.  BONES: Degenerative changes.    IMPRESSION:  Diffuse thoracic and abdominopelvic lymphadenopathy. Mild splenomegaly.   Consideration is given to a infectious/inflammatory process versus a   neoplastic process such as lymphoproliferative disease.    Mild hepatomegaly. Periportal edema, a nonspecific finding that may be   seen with hepatitis or aggressive fluid resuscitation. Correlate with   liver function tests.    Underdistended gallbladder. Edematous gallbladder wall thickening, also   nonspecific and may be related to underlying liver pathology.    Gastric wall thickening versus underdistention, possibly representing   gastritis. Consider correlation with upper endoscopy to assess for   underlying neoplasm.    A few small nonspecific pulmonary nodules. Continued follow-up is   recommended.    Small right and trace left pleural effusions.    Small volume ascites.    < end of copied text >  < from: US Abdomen Complete (US Abdomen Complete .) (22 @ 14:17) >    ACC: [id number] EXAM:  US ABDOMEN COMPLETE                          PROCEDURE DATE:  2022          INTERPRETATION:  CLINICAL INFORMATION: Abnormal liver function tests.    COMPARISON: None available.    TECHNIQUE: Sonography of the abdomen.    FINDINGS:  Liver: Normal in size and echogenicity. No focal lesions are identified.  Bile ducts: Normal caliber. Common bile duct measures .  Gallbladder: Diffuse gallbladder wall edema measuring up to 9 mm. No   stones are identified. There is no sonographic Hardin sign. Findings are   nonspecific.  Pancreas: There is 6 mm cyst in the region of the body of the pancreas.   The rest of the visualized portion of the pancreas is unremarkable.  Spleen: 9.8 cm. Within normal limits.  Right kidney: 9.6 cm. No hydronephrosis.  Left kidney: 10.1 cm.. No hydronephrosis.  Ascites: Trace amount of perihepatic fluid.  Aorta and IVC: Visualized portions are within normal limits.    Trace right pleural effusion.    There are multiple enlarged periportal lymph nodes measuring up to 4.5 x   1.9 cm.    IMPRESSION:  Diffuse gallbladder wall edema which is a nonspecific finding.    Periportal adenopathy.    Subcentimeter pancreatic body cyst.    Consider further evaluation with contrast enhanced dual phase abdominal   and pelvic CT.        < end of copied text >  
Date/Time Patient Seen:  		  Referring MD:   Data Reviewed	       Patient is a 62y old  Male who presents with a chief complaint of fever (26 Dec 2022 19:56)      Subjective/HPI   62 yr old with PMH of HTN ,h/o appendectomy presenting with fevers, body aches, diarrhea, hasf 6 days of fever (T-max 102) headache body aches coughing status post trip to [place].  Patient relates went to urgent care 3 days ago had a negative flu and COVID rapid test but was given Tamiflu anyway in case he still had the flu despite the negative test.  Patient developed nausea and some vomiting after starting Tamiflu he called his doctor yesterday they started him on doxycycline in case he had a bacterial infection.  No sore throat runny nose abdominal pain chest pain short of breath urinary complaints rash neck stiffness or photophobia. Last Tylenol approximately 1.5 hours prior to arrival  .PMD Attard   In ED febrile, with tachycadia, tachypnea, no leucocytosis, has lymphopenia,elevated LFT, had US abd -diffuse GB edema and periportal adenopathy,seen by surg- has elevated bun creat , had CT abd chest-< from: CT Chest w/ IV Cont  PAST MEDICAL & SURGICAL HISTORY:        Medication list         MEDICATIONS  (STANDING):  losartan 50 milliGRAM(s) Oral daily  pantoprazole    Tablet 40 milliGRAM(s) Oral before breakfast  sodium chloride 0.9%. 1000 milliLiter(s) (75 mL/Hr) IV Continuous <Continuous>    MEDICATIONS  (PRN):  ibuprofen  Tablet. 400 milliGRAM(s) Oral every 8 hours PRN Temp greater or equal to 38C (100.4F), Moderate Pain (4 - 6)         Vitals log        ICU Vital Signs Last 24 Hrs  T(C): 37.4 (26 Dec 2022 16:00), Max: 39.4 (26 Dec 2022 11:55)  T(F): 99.3 (26 Dec 2022 16:00), Max: 103 (26 Dec 2022 11:55)  HR: 90 (26 Dec 2022 16:00) (90 - 112)  BP: 112/75 (26 Dec 2022 16:00) (112/75 - 134/86)  BP(mean): --  ABP: --  ABP(mean): --  RR: 15 (26 Dec 2022 16:00) (15 - 22)  SpO2: 97% (26 Dec 2022 16:00) (96% - 97%)    O2 Parameters below as of 26 Dec 2022 16:00  Patient On (Oxygen Delivery Method): room air     Preferred Language to Address Healthcare:  · Preferred Language to Address Healthcare	English     Child Abuse Assessment (patients less than 13 yrs):  Chief Complaint: fever.    · Chief Complaint: The patient is a 62y Male complaining of fever.  · HPI Objective Statement: Patient complaining of 6 days of fever (T-max 102) headache body aches coughing status post trip to [place].  Patient relates went to urgent care 3 days ago had a negative flu and COVID rapid test but was given Tamiflu anyway in case he still had the flu despite the negative test.  Patient developed nausea and some vomiting after starting Tamiflu he called his doctor yesterday they started him on doxycycline in case he had a bacterial infection.  No sore throat runny nose abdominal pain chest pain short of breath urinary complaints rash neck stiffness or photophobia. Last Tylenol approximately 1.5 hours prior to arrival.  PMD Attard  · Presenting Symptoms: BODY ACHES, COUGH, FEVER, HEADACHE  · Highest Temperature: fahrenheit  102  · Timing: constant  · Duration: day(s)  6  · Severity: MODERATE  · Context: known (describe)/ recent travel  · Recent Exposure To: none known    HIV:    HIV Test Questions:  · In accordance with [place] State law, we offer every patient who comes to our ED an HIV test. Would you like to be tested today?	Opt out    PAST MEDICAL/SURGICAL/FAMILY/SOCIAL HISTORY:    Tobacco Usage:  · Tobacco Usage	Never smoker    ALLERGIES AND HOME MEDICATIONS:   Allergies:        Allergies:  	Vicodin: Drug, Hives    Home Medications:   * Patient Currently Takes Medications as of 26-Dec-2022 10:55 documented in Structured Notes  · 	atorvastatin 40 mg oral tablet: Last Dose Taken:  , 1 tab(s) orally once a day  · 	losartan 50 mg oral tablet: Last Dose Taken:  , 1 tab(s) orally once a day  · 	Tamiflu: Last Dose Taken:    · 	doxycycline hyclate 100 mg oral tablet: Last Dose Taken:  , 1 tab(s) orally 2 times a day    REVIEW OF SYSTEMS:    Review of Systems:  · RESPIRATORY: - - -  · Respiratory [+]: COUGH  · GASTROINTESTINAL: - - -  · Gastrointestinal [+]: NAUSEA, VOMITING  · Gastrointestinal [-]: no abdominal pain  · GENITOURINARY: no dysuria, no frequency, and no hematuria.  · MUSCULOSKELETAL: - - -  · Musculoskeletal [+]: body aches  · NEUROLOGICAL: - - -  · Neurological [+]: HEADACHE  · ROS STATEMENT: all other ROS negative except as per HPI    VITAL SIGNS ([place]):    ,,ED ADULT Flow Sheet:    26-Dec-2022 10:50  · Temp (F): 100.3  · Temp (C) Temp (C): 37.9  · Temp site Temp Site: oral  · Heart Rate Heart Rate (beats/min): 112  · BP Systolic Systolic: 134  · BP Diastolic Diastolic (mm Hg): 86  · Respiration Rate (breaths/min) Respiration Rate (breaths/min): 22  · SpO2 (%) SpO2 (%): 96  · O2 Delivery/Oxygen Delivery Method Patient On (Oxygen Delivery Method): room air  · How was the weight captured? Weight Type/Method: stated  · Dosing Weight (KILOGRAMS) Dosing Weight (KILOGRAMS): 76.2  · Dosing Weight  (POUNDS) Dosing Weight (POUNDS): 167.9  · Height (FEET) Height (FEET): 5  · Height (INCHES) Height (INCHES): 8  · Height (CENTIMETERS) Height (CENTIMETERS): 172.72  · BSA (m2): 1.9  · BMI (kG/m2) BMI (kG/m2): 25.5  · Ideal Body Weight(kg) Ideal Body Weight(kg): 68  · Presence of Pain: denies pain/discomfort  · SpO2 (%) SpO2 (%): 96  · Preferred Language to Address Healthcare Preferred Language to Address Healthcare: English               Input and Output:  I&O's Detail      Lab Data                        13.2   9.27  )-----------( 145      ( 26 Dec 2022 11:34 )             39.8     12-26    137  |  100  |  24<H>  ----------------------------<  108<H>  4.5   |  26  |  1.39<H>    Ca    8.6      26 Dec 2022 11:34    TPro  7.7  /  Alb  3.2<L>  /  TBili  0.6  /  DBili  x   /  AST  68<H>  /  ALT  155<H>  /  AlkPhos  190<H>  12-26            Review of Systems	  dyspepsia  abd pain    Objective     Physical Examination    heart s1s2  lung dc BS  head nc  verbal  alert  cn grossly int  moves all extr      Pertinent Lab findings & Imaging      [place]:  NO   Adequate UO     I&O's Detail           Discussed with:     Cultures:	        Radiology    ACC: [id number] EXAM:  CT ABDOMEN AND PELVIS IC                        ACC: [id number] EXAM:  CT CHEST IC                          PROCEDURE DATE:  12/26/2022          INTERPRETATION:  CLINICAL INFORMATION: Fever, cough, nausea, and vomiting.    COMPARISON: Abdominal ultrasound 12/26/2022.    CONTRAST/COMPLICATIONS:  IV Contrast: Omnipaque 350  90 cc administered   10 cc discarded  Oral Contrast: NONE  Complications: None reported at time of study completion    PROCEDURE:  CT of the Chest, Abdomen and Pelvis was performed.  Sagittal and coronal reformats were performed.    FINDINGS:  CHEST:  LUNGS AND LARGE AIRWAYS: Patent central airways. There is a 7 mm nodule   along the minor fissure, likely representing an intrapulmonary lymph node   (series 5 image 259). Right lower lobe nodule measuring 3 mm (series 5   image 362). Left lower lobe nodule measuring 5 mm (series 5 image 398).   Subsegmental atelectatic changes in the bilateral lower lobes and lingula.  PLEURA: Small right and trace left pleural effusions. No pneumothorax.  VESSELS: Within normal limits.  HEART: Heart size is normal. No pericardial effusion.  MEDIASTINUM, MELANIE, AND LYMPH NODES: Bilateral axillary, bilateral hilar,   and mediastinal lymphadenopathy. Prominent lower cervical chain lymph   nodes bilaterally. Examples are as follows:  *  Left axillary lymph node measuring 2.7 x 1.8 cm in (series 3 image 17).  *  Right hilar lymph node measuring 1.6 x 1.1 cm (series 3 image 86).  *  Precarinal lymph node measuring 1.9 x 1.1 cm (series 3 image 63).  CHEST WALL AND LOWER NECK: Within normal limits.    ABDOMEN AND PELVIS:  LIVER: Mildly enlarged, measures 18.1 cm craniocaudally in the right   lobe. Subcentimeter hypodense focus in segment 5 that is too small to   characterize. Periportal edema. The hepatic and portal veins are patent.  BILE DUCTS: Normal caliber.  GALLBLADDER: Underdistended gallbladder with diffuse edematous wall   thickening.  SPLEEN: Mildly enlarged, measures 14.8 cm.  PANCREAS: Unremarkable appearance of the pancreas. The 6 mm cyst in the   region of the body pancreas described on the recent ultrasound is not   identified on the current exam. No pancreatic duct dilation.  ADRENALS: Within normal limits.  KIDNEYS/URETERS: Within normal limits.    BLADDER: Within normal limits.  REPRODUCTIVE ORGANS: Prostate within normal limits.    BOWEL: Gastric wall thickening, most prominently along the greater   curvature of the gastric body, possibly related to underdistention. No   bowel obstruction. The appendix is not visualized.  PERITONEUM: Small volume of free abdominopelvic fluid. No organized fluid   collection. No pneumoperitoneum.  VESSELS: Mild atherosclerotic changes.  RETROPERITONEUM/LYMPH NODES: Mild presacral edema. Retroperitoneal,   periportal, gastrohepatic, and peripancreatic lymphadenopathy, as well as   diffusely prominent mesenteric lymph nodes measuring up to 1 cm in short   axis, with examples as follows:  *  Portal caval lymph node measuring 4.3 x 2.2 cm (series 3 image 163).  *  Gastrohepatic lymph node measuring 1.8 x 1.7 cm (series 3 image 151).  *  Left para-aortic lymph node measuring 2.1 x 1.7 cm (series 3 image   195).  ABDOMINAL WALL: Within normal limits.  BONES: Degenerative changes.    IMPRESSION:  Diffuse thoracic and abdominopelvic lymphadenopathy. Mild splenomegaly.   Consideration is given to a infectious/inflammatory process versus a   neoplastic process such as lymphoproliferative disease.    Mild hepatomegaly. Periportal edema, a nonspecific finding that may be   seen with hepatitis or aggressive fluid resuscitation. Correlate with   liver function tests.    Underdistended gallbladder. Edematous gallbladder wall thickening, also   nonspecific and may be related to underlying liver pathology.    Gastric wall thickening versus underdistention, possibly representing   gastritis. Consider correlation with upper endoscopy to assess for   underlying neoplasm.    A few small nonspecific pulmonary nodules. Continued follow-up is   recommended.    Small right and trace left pleural effusions.    Small volume ascites.        --- End of Report ---            BRENDA NGUYEN MD; Attending Radiologist  This document has been electronically signed. Dec 26 2022  6:27PM                          
[facility] DIVISION OF INFECTIOUS DISEASES  ANH Sandoval S. Shah, Y. Patel, G. [facility]  [phone]  ([phone] - weekdays after 5pm and weekends)      62y, Male  [id number]    HPI:  Patient is a 62 year old male with PMH of HTN, h/o appendectomy who presented with complaint of fever and headache. Patient states he went to visit family in [place] last week and was having mild headaches and feeling unwell but was able to carry on. He reports they went out to a pub for dinner last Wednesday night and he had tilapia tacos, everyone else had burgers and other foods. He states overnight he developed a rash all over which did improved after taking benadryl. He flew back to [place] Thursday morning  and states that night he had a fever to 101-102F with headache and shivering, at this point he did not have any coughing, diarrhea or other complaints but he did notice that night he again had a rash all over with welts. He took an antihistamine they had at home and the rash did resolve in about an hour. He has not had any history of seafood or other allergies other than Vicodin which he has not taken but this was a new fish he tried. On Friday morning, he went to an Urgent care where he tested negative for COVID and influenza but he had a fever of 102F so was started on tamiflu. Reports he started to feel worse after starting tamiflu with continued fevers and headaches. He had reached out to his PMD, covering PMD started him on doxycycline and after that he had some watery diarrhea. He continued to have fevers and chills yesterday and so came to the ER. He has been having some cough but can't recall exactly when it started, he had no cough while in [place]. Reports poor oral intake, felt he was dehydrated. Has felt his abdomen has been distended but has not had any abdominal pain, nausea or vomiting and diarrhea has since resolved. He had a fever of 103F in the ER and then overnight and this morning has not any fevers or chills. Wife and patient states in [place] they had gone to a lighting show at a zoo, indoor aquarium and ate out a few times. States rest of the family is doing well and that he felt unwell prior to going to [place]. He denies sore throat, rhinorrhea, dyspnea, neck stiffness, photophobia, chest pain, dysuria, increased frequency or any other complaints. States he has not had the rash again since Thursday night. Last Tylenol approximately 1.5 hours prior to arrival.   In ED febrile with tachycardia, tachypnea, no leucocytosis but with lymphopenia, elevated LFT. US abd with diffuse GB edema and periportal adenopathy, seen by surgery with no plan for surgical intervention. He was noted with elevated bun creatinine. CT chest/abd/pelvis with IV contrast showed diffuse thoracic and abdominopelvic lymphadenopathy, mild splenomegaly - consideration is given to a infectious/inflammatory process versus a neoplastic process such as lymphoproliferative disease. Mild hepatomegaly. Periportal edema, a nonspecific finding that may be seen with hepatitis or aggressive fluid resuscitation. Correlate with  liver function tests. Underdistended gallbladder. Edematous gallbladder wall thickening, also  nonspecific and may be related to underlying liver pathology. Gastric wall thickening versus underdistention, possibly representing  gastritis. Consider correlation with upper endoscopy to assess for underlying neoplasm. A few small nonspecific pulmonary nodules. Continued follow-up is recommended. Small right and trace left pleural effusions. Small volume ascites.  Being admitted for further management. Patients wife at bedside.   ROS: 14 point review of systems completed, pertinent positives and negatives as per HPI.    Allergies: Vicodin (Hives)    PMH -- HTN  PSH -- appendectomy  FH -- noncontributory   Social History -- denies tobacco, alcohol or illicit drug use    Physical Exam--  Vital Signs Last 24 Hrs  T(F): 97.6 (27 Dec 2022 11:59), Max: 99.3 (26 Dec 2022 16:00)  HR: 74 (27 Dec 2022 11:59) (74 - 90)  BP: 100/57 (27 Dec 2022 11:59) (95/56 - 114/65)  RR: 17 (27 Dec 2022 11:59) (15 - 18)  SpO2: 94% (27 Dec 2022 11:59) (94% - 97%)  General: no acute distress  HEENT: NC/AT, EOMI, anicteric, neck supple  Lungs: Clear bilaterally without rales, wheezing or rhonchi  Heart: S1, S2 present, RRR. No murmur, rub or gallop.  Abdomen: Soft. Distended. Nontender. BS present.   Neuro: AAOx3, no obvious focal deficits   Extremities: No cyanosis or clubbing. No edema.   Skin: Warm. Dry. Good turgor. No visible rash.   Psychiatric: Appropriate affect and mood for situation.   Lines: PIV    Laboratory & Imaging Data--  CBC:                       11.4   10.95 )-----------( 167      ( 27 Dec 2022 07:33 )             34.0     WBC Count: 10.95 K/uL (22 @ 07:33)  WBC Count: 9.27 K/uL (22 @ 11:34)    CMP:     139  |  106  |  26<H>  ----------------------------<  132<H>  4.8   |  27  |  0.89    Ca    8.3<L>      27 Dec 2022 07:33    TPro  7.1  /  Alb  2.7<L>  /  TBili  0.3  /  DBili  x   /  AST  37  /  ALT  112<H>  /  AlkPhos  150<H>      LIVER FUNCTIONS - ( 27 Dec 2022 07:33 )  Alb: 2.7 g/dL / Pro: 7.1 g/dL / ALK PHOS: 150 U/L / ALT: 112 U/L DA / AST: 37 U/L / GGT: x           Urinalysis Basic - ( 26 Dec 2022 13:20 )  Color: Yellow / Appearance: Clear / S.020 / pH: x  Gluc: x / Ketone: Moderate  / Bili: Negative / Urobili: Negative mg/dL   Blood: x / Protein: 30 mg/dL / Nitrite: Negative   Leuk Esterase: Trace / RBC: Negative /HPF / WBC 0-2   Sq Epi: x / Non Sq Epi: Occasional / Bacteria: Few      Microbiology: reviewed   Flu With COVID-19 By [facility]DESSA (22 @ 11:34)    SARS-CoV-2 Result: NotDetec: This Respiratory Panel uses polymerase chain reaction (PCR) to detect for  influenza A; influenza B; respiratory syncytial virus; and SARS-CoV-2.  This test was validated by [employer] and is in use under the FDA  Emergency Use Authorization (EUA) for clinical labs CLIA-certified to  perform high complexity testing. Test results should be correlated with  clinical presentation, patient history, and epidemiology.    Influenza A Result: [facility]    Influenza B Result: [facility]    Resp Syn Virus Result: [facility]    Radiology--reviewed  CT Chest Abdomen Pelvis w/ IV Cont (22 @ 17:01) >  IMPRESSION: Diffuse thoracic and abdominopelvic lymphadenopathy. Mild splenomegaly.   Consideration is given to a infectious/inflammatory process versus a neoplastic process such as lymphoproliferative disease.  Mild hepatomegaly. Periportal edema, a nonspecific finding that may be seen with hepatitis or aggressive fluid resuscitation. Correlate with liver function tests.  Underdistended gallbladder. Edematous gallbladder wall thickening, also nonspecific and may be related to underlying liver pathology.  Gastric wall thickening versus underdistention, possibly representing gastritis. Consider correlation with upper endoscopy to assess for  underlying neoplasm.  A few small nonspecific pulmonary nodules. Continued follow-up is recommended.  Small right and trace left pleural effusions.  Small volume ascites.    US Abdomen Complete (US Abdomen Complete .) (22 @ 14:17) >IMPRESSION: Diffuse gallbladder wall edema which is a nonspecific finding. Periportal adenopathy. Subcentimeter pancreatic body cyst Consider further evaluation with contrast enhanced dual phase abdominal and pelvic CT.    Xray Chest 1 View-PORTABLE IMMEDIATE (22 @ 11:12) >Impression: Lungs are clear. No large effusions or pneumothoraces Cardiomediastinal silhouette is within normal limits.  Osseous structures are unremarkable for age.     Active Medications--  acetaminophen     Tablet .. 650 milliGRAM(s) Oral every 6 hours PRN  ibuprofen  Tablet. 400 milliGRAM(s) Oral every 8 hours PRN  influenza   Vaccine 0.5 milliLiter(s) IntraMuscular once  losartan 50 milliGRAM(s) Oral daily  pantoprazole    Tablet 40 milliGRAM(s) Oral before breakfast  sodium chloride 0.9%. 1000 milliLiter(s) IV Continuous <Continuous>    Immunologic:   influenza   Vaccine 0.5 milliLiter(s) IntraMuscular once    
[facility]  Víctor Leon PA-C  [address] [place] [id number]  [phone]      Chief Complaint:  Patient is a 62y old  Male who presents with a chief complaint of fever (27 Dec 2022 09:29)      HPI:62 yr old with PMH of HTN ,h/o appendectomy presenting with fevers, body aches, diarrhea, hasf 6 days of fever (T-max 102) headache body aches coughing status post trip to [place].  Patient relates went to urgent care 3 days ago had a negative flu and COVID rapid test but was given Tamiflu anyway in case he still had the flu despite the negative test.  Patient developed nausea and some vomiting after starting Tamiflu he called his doctor yesterday they started him on doxycycline in case he had a bacterial infection.  No sore throat runny nose abdominal pain chest pain short of breath urinary complaints rash neck stiffness or photophobia. Last Tylenol approximately 1.5 hours prior to a    Allergies:  Vicodin (Hives)      Medications:  acetaminophen     Tablet .. 650 milliGRAM(s) Oral every 6 hours PRN  ibuprofen  Tablet. 400 milliGRAM(s) Oral every 8 hours PRN  influenza   Vaccine 0.5 milliLiter(s) IntraMuscular once  losartan 50 milliGRAM(s) Oral daily  pantoprazole    Tablet 40 milliGRAM(s) Oral before breakfast  sodium chloride 0.9%. 1000 milliLiter(s) IV Continuous <Continuous>      PMHX/PSHX:      Family history:      Social History:     ROS:     General:  No wt loss, + fevers, chills, night sweats, fatigue,   Eyes:  Good vision, no reported pain  ENT:  No sore throat, pain, runny nose, dysphagia  CV:  No pain, palpitations, hypo/hypertension  Resp:  No dyspnea, + cough, tachypnea, wheezing  GI:  No pain, No nausea, No vomiting, No diarrhea, No constipation, No weight loss, No fever, No pruritis, No rectal bleeding, No tarry stools, No dysphagia,  :  No pain, bleeding, incontinence, nocturia  Muscle:  No pain, weakness  Neuro:  No weakness, tingling, memory problems  Psych:  No fatigue, insomnia, mood problems, depression  Endocrine:  No polyuria, polydipsia, cold/heat intolerance  Heme:  No petechiae, ecchymosis, easy bruisability  Skin:  No rash, tattoos, scars, edema      PHYSICAL EXAM:   Vital Signs:  Vital Signs Last 24 Hrs  T(C): 36.4 (27 Dec 2022 11:59), Max: 37.4 (26 Dec 2022 16:00)  T(F): 97.6 (27 Dec 2022 11:59), Max: 99.3 (26 Dec 2022 16:00)  HR: 74 (27 Dec 2022 11:59) (74 - 90)  BP: 100/57 (27 Dec 2022 11:59) (95/56 - 114/65)  BP(mean): --  RR: 17 (27 Dec 2022 11:59) (15 - 18)  SpO2: 94% (27 Dec 2022 11:59) (94% - 97%)    Parameters below as of 27 Dec 2022 11:59  Patient On (Oxygen Delivery Method): room air      Daily     Daily     GENERAL:  Appears stated age,   HEENT:  NC/AT,    CHEST:  Full & symmetric excursion,   HEART:  Regular rhythm  ABDOMEN:  Soft, non-tender, non-distended,   EXTEREMITIES:  no cyanosis,clubbing or edema  SKIN:  No rash  NEURO:  Alert,    LABS:                        11.4   10.95 )-----------( 167      ( 27 Dec 2022 07:33 )             34.0         139  |  106  |  26<H>  ----------------------------<  132<H>  4.8   |  27  |  0.89    Ca    8.3<L>      27 Dec 2022 07:33    TPro  7.1  /  Alb  2.7<L>  /  TBili  0.3  /  DBili  x   /  AST  37  /  ALT  112<H>  /  AlkPhos  150<H>      LIVER FUNCTIONS - ( 27 Dec 2022 07:33 )  Alb: 2.7 g/dL / Pro: 7.1 g/dL / ALK PHOS: 150 U/L / ALT: 112 U/L DA / AST: 37 U/L / GGT: x           PT/INR - ( 26 Dec 2022 11:34 )   PT: 16.8 sec;   INR: 1.42 ratio         PTT - ( 26 Dec 2022 11:34 )  PTT:41.6 sec  Urinalysis Basic - ( 26 Dec 2022 13:20 )    Color: Yellow / Appearance: Clear / S.020 / pH: x  Gluc: x / Ketone: Moderate  / Bili: Negative / Urobili: Negative mg/dL   Blood: x / Protein: 30 mg/dL / Nitrite: Negative   Leuk Esterase: Trace / RBC: Negative /HPF / WBC 0-2   Sq Epi: x / Non Sq Epi: Occasional / Bacteria: Few          Imaging:

## 2022-12-27 NOTE — CONSULT NOTE ADULT - ASSESSMENT
62 yr old with PMH of HTN ,h/o appendectomy presenting with fevers, body aches, diarrhea, hasf 6 days of fever (T-max 102) headache body aches coughing status post trip to [place].     small pleural eff  diffuse lymphadenopathy  atelectasis  pulm nodules - sub cm - in a non smoker -   HTN  OA  fever - aches - diarrhea - dyspepsia -     CT chest and abd noted  GI eval   Heme Onc eval   cvs rx regimen and BP control  biomarkers  IV hydration  serial ABD exam  
63 y/o man with PMH of HTN ,h/o appendectomy. Recently visited family in [place], reports ate out last Wed, dev hives, returned to  on Thurs, dev fever uo to 102. Also w malaises poor po, cough w clear fluid. Was seen in Urgent Care Friday, COVID and Flu tests negative but given Temaflu, also took antibiotics called in by covering PMD and had diarrhea(resolved), then came to ER  CT c/a/p-above and below diaphragm LADs, including axillary, hilar, precarinal, portal caval, gastrohepatic, paraarotic(largest 4.3x2.2cm portocaval, also hepatosplenomegaly spleen 14.8cm liver 18.1cm. Small subcentimeter pulm nodules. Small right and trace left effusion. subcentimeter hypodense liver lesions    -pt w nonspecific malaise x few weeks, ~3 days of fever. CT c/a/p w mild hepatosplenomegaly, above and below diaphragm LADs up to 4+cm protocaval  -adm labs also w mild elevated LFT that has improved  -seen by Pulm, GI, Surgery  -suspicion for malignancy, needs tissue dx, best would be LN excisional bx(left axillar was noted as 2/7cm on CT but not palpable by me)    further Heme/Onc recommendations pending above    thank you, will follow w you  
Patient is a 62 year old male with PMH of HTN, h/o appendectomy who presented with complaint of fever, chills, headache. Patient felt unwell with headache prior to going to [place]. In [place], after going to a pub and having tilapia tacos - he developed a diffuse body rash overnight which resolved with benadryl. He has not had any fish allergies before but this was a new fish. He then started to have fevers the following night with chills and continued headache, Tm 102F. He went to  Friday and tested negative for flu and COVID but was febrile so started on tamiflu but felt worse after. His covering PMD started him on doxycycline and reports having watery diarrhea and abdominal distention; diarrhea has since resolved but still feels abdomen is distended. No nausea, vomiting, abdominal pain.     Fever with lymphopenia, eosinophilia, transaminitis, CLAYTON   with imaging showing diffuse lymphadenopathy, mild hepatosplenomegaly, periportal edema, gall bladder wall thickening, few small nonspecific pulm nodules and small -trace pleural effusions, small ascites  --unclear etiology ?viral, rule out hepatitis vs food borne related vs neoplastic   Rash unclear etiology -- had twice -- resolved with antihistamine each time, did have eosinophilia ?allergic reaction  - afebrile since last night, not on any abx  - lymphopenia, eosinophilia resolved, LFTs downtrending  - mildly elevated WBC with no neutrophilia  - COVID/RSV/Flu negative   - UA negative for pyuria   - CT C/A/Previewed as above   Recommendations:   -- send GI stool PCR and stool culture if diarrhea develops  -- send acute hepatitis panel  -- send EBV and CMV serology   -- follow HCV ab, in process  -- follow blood cultures   -- GI, Surgery and Heme/Onc following  -- can continue to monitor off antibiotics  -- monitor temps/CBC, CMP      Taqueria Goodman M.D.  [facility], Division of Infectious Diseases  [phone]  After 5pm on weekdays and all day on weekends - please call [phone]
a/p diffuse lymphadenoipathy    plan  ppi once a day  daily cbc   transfuse prn   consider hematology eval  check iron studies   ppi once a day  check stool occult blood  further recommendations pending above   upper gastrointestinal endoscopy colonosocpy if hematology considers it neccesary    Advanced care planning was discussed with patient and family.  Advanced care planning forms were reviewed and discussed.  Risks, benefits and alternatives of gastroenterologic procedures were discussed in detail and all questions were answered.    30 minutes spent.  
abnormal CT  cough  fever    cont reg diet  check GI pcr if diarrhea develops  Heme/ID eval pending  will need egd/colon once cough resolves; likely as outpatient   d/w patient and wife at bedside    I reviewed the overnight course of events on the unit, re-confirming the patient history. I discussed the care with the patient and their family  The plan of care was discussed with the physician assistant and modifications were made to the notation where appropriate.   Differential diagnosis and plan of care discussed with patient after the evaluation  35 minutes spent on total encounter of which more than fifty percent of the encounter was spent counseling and/or coordinating care by the attending physician.  Advanced care planning was discussed with patient and family.  Advanced care planning forms were reviewed and discussed.  Risks, benefits and alternatives of gastroenterologic procedures were discussed in detail and all questions were answered.  
62M with fevers, body aches, diarrhea after having fish, found to have GB wall edema which is a nonspecific finding. May be related to intraabdominal infection. More likely has a GI infection given symptoms  -stool cultures/ova/parasites  -CT abdomen/pelvis given abdominal distention and diarrhea  -likely will need admission for hydration and further workup given patient has CLAYTON 2/2 dehydration  -trend LFTs  -medicine admission  -GI consult  -no role for surgical intervention at this time  -will follow

## 2022-12-27 NOTE — PROGRESS NOTE ADULT - SUBJECTIVE AND OBJECTIVE BOX
Date/Time Patient Seen:  		  Referring MD:   Data Reviewed	       Patient is a 62y old  Male who presents with a chief complaint of fever (26 Dec 2022 21:09)      Subjective/HPI     PAST MEDICAL & SURGICAL HISTORY:        Medication list         MEDICATIONS  (STANDING):  influenza   Vaccine 0.5 milliLiter(s) IntraMuscular once  losartan 50 milliGRAM(s) Oral daily  pantoprazole    Tablet 40 milliGRAM(s) Oral before breakfast  sodium chloride 0.9%. 1000 milliLiter(s) (75 mL/Hr) IV Continuous <Continuous>    MEDICATIONS  (PRN):         Vitals log        ICU Vital Signs Last 24 Hrs  T(C): 36.6 (26 Dec 2022 22:33), Max: 39.4 (26 Dec 2022 11:55)  T(F): 97.9 (26 Dec 2022 22:33), Max: 103 (26 Dec 2022 11:55)  HR: 87 (26 Dec 2022 22:33) (87 - 112)  BP: 114/65 (26 Dec 2022 22:33) (112/75 - 134/86)  BP(mean): --  ABP: --  ABP(mean): --  RR: 18 (26 Dec 2022 22:33) (15 - 22)  SpO2: 96% (26 Dec 2022 22:33) (96% - 97%)    O2 Parameters below as of 26 Dec 2022 16:00  Patient On (Oxygen Delivery Method): room air                 Input and Output:  I&O's Detail      Lab Data                        13.2   9.27  )-----------( 145      ( 26 Dec 2022 11:34 )             39.8     12-26    137  |  100  |  24<H>  ----------------------------<  108<H>  4.5   |  26  |  1.39<H>    Ca    8.6      26 Dec 2022 11:34    TPro  7.7  /  Alb  3.2<L>  /  TBili  0.6  /  DBili  x   /  AST  68<H>  /  ALT  155<H>  /  AlkPhos  190<H>  12-26            Review of Systems	      Objective     Physical Examination    heart s1s2  lung dec BS  head nc  verbal  cn grossly int      Pertinent Lab findings & Imaging      [place]:  NO   Adequate UO     I&O's Detail           Discussed with:     Cultures:	        Radiology

## 2022-12-27 NOTE — PHYSICAL THERAPY INITIAL EVALUATION ADULT - PERTINENT HX OF CURRENT PROBLEM, REHAB EVAL
62 yr old with PMH of HTN ,h/o appendectomy presenting with fevers, body aches, diarrhea, hasf 6 days of fever (T-max 102) headache body aches coughing status post trip to [place].  Patient relates went to urgent care 3 days ago had a negative flu and COVID rapid test but was given Tamiflu anyway in case he still had the flu despite the negative test.  Patient developed nausea and some vomiting after starting Tamiflu he called his doctor yesterday they started him on doxycycline in case he had a bacterial infection.  No sore throat runny nose abdominal pain chest pain short of breath urinary complaints rash neck stiffness or photophobia. Last Tylenol approximately 1.5 hours prior to arrival  .PMD Attard

## 2022-12-27 NOTE — CONSULT NOTE ADULT - CONSULT REQUESTED DATE/TIME
27-Dec-2022 15:52
27-Dec-2022 03:28
27-Dec-2022 12:42
27-Dec-2022 14:13
26-Dec-2022 21:09
26-Dec-2022
27-Dec-2022 13:35

## 2022-12-27 NOTE — PHYSICAL THERAPY INITIAL EVALUATION ADULT - ADDITIONAL COMMENTS
pt lives in an apartment with wife. Has 3 INESSA with bilateral handrails. Pt was independent prior to visit. Pt has tub shower.

## 2022-12-27 NOTE — PROGRESS NOTE ADULT - ASSESSMENT
62 yr old with PMH of HTN ,h/o appendectomy presenting with fevers, body aches, diarrhea, hasf 6 days of fever (T-max 102) headache body aches coughing status post trip to [place].     small pleural eff  diffuse lymphadenopathy  atelectasis  pulm nodules - sub cm - in a non smoker -   HTN  OA  fever - aches - diarrhea - dyspepsia -     CT chest and abd noted  GI eval   Heme Onc eval   cvs rx regimen and BP control  biomarkers  IV hydration  serial ABD exam

## 2022-12-27 NOTE — PROGRESS NOTE ADULT - SUBJECTIVE AND OBJECTIVE BOX
SURGERY Progress Note PA    62 yr old with PMH of HTN, h/o appendectomy presenting with fevers, body aches, diarrhea, has 6 days of fever (T-max 102) headache body aches coughing status post trip to [place].    Patient relates went to urgent care 3 days ago had a negative flu and COVID rapid test but was given Tamiflu anyway in case he still had the flu despite the negative test.    Patient developed nausea and some vomiting after starting Tamiflu he called his doctor yesterday they started him on doxycycline in case he had a bacterial infection.  No sore throat runny nose abdominal pain chest pain short of breath urinary complaints rash neck stiffness or photophobia. Last Tylenol approximately 1.5 hours prior to arrival  PMD Attard    SUBJECTIVE:   Patient seen at bedside, no overnight events, no complaints noted and pain is controlled at this time.   Patient admits to flatus, bowel movement.   Patient denies any headaches, chest pain, shortness of breath, nausea, vomiting, fever, chills, weakness, dysuria  Patient A+Ox3 in NAD at time of visit.    OBJECTIVE:   T(C): 36.4 (22 @ 06:00), Max: 39.4 (22 @ 11:55)  HR: 74 (22 @ 06:00) (74 - 112)  BP: 95/56 (22 @ 06:00) (95/56 - 134/86)  RR: 17 (22 @ 06:00) (15 - 22)  SpO2: 96% (22 @ 06:00) (96% - 97%)  CAPILLARY BLOOD GLUCOSE    I&O's Detail    Physical exam:  General: A+O x 3 in NAD  HEENT: PERRLA, EOM intact  Neck: trachea midline  Chest: Clear through auscultation bilaterally, No rales, rhonchi wheezes noted bilaterally  Heart: S1,S1 RRR, no murmurs noted  Abdomen: soft non distended, non tender, BS x 4, no guarding noted  Extremities: no edema noted, warm,  no calf tenderness     MEDICATIONS  (STANDING):  influenza   Vaccine 0.5 milliLiter(s) IntraMuscular once  losartan 50 milliGRAM(s) Oral daily  pantoprazole    Tablet 40 milliGRAM(s) Oral before breakfast  sodium chloride 0.9%. 1000 milliLiter(s) (75 mL/Hr) IV Continuous <Continuous>    MEDICATIONS  (PRN):  ibuprofen  Tablet. 400 milliGRAM(s) Oral every 8 hours PRN Mild Pain (1 - 3)      LABS:                        11.4   10.95 )-----------( 167      ( 27 Dec 2022 07:33 )             34.0         139  |  106  |  26<H>  ----------------------------<  132<H>  4.8   |  27  |  0.89    Ca    8.3<L>      27 Dec 2022 07:33    TPro  7.1  /  Alb  2.7<L>  /  TBili  0.3  /  DBili  x   /  AST  37  /  ALT  112<H>  /  AlkPhos  150<H>      PT/INR - ( 26 Dec 2022 11:34 )   PT: 16.8 sec;   INR: 1.42 ratio         PTT - ( 26 Dec 2022 11:34 )  PTT:41.6 sec  Urinalysis Basic - ( 26 Dec 2022 13:20 )    Color: Yellow / Appearance: Clear / S.020 / pH: x  Gluc: x / Ketone: Moderate  / Bili: Negative / Urobili: Negative mg/dL   Blood: x / Protein: 30 mg/dL / Nitrite: Negative   Leuk Esterase: Trace / RBC: Negative /HPF / WBC 0-2   Sq Epi: x / Non Sq Epi: Occasional / Bacteria: Few    RADIOLOGY & ADDITIONAL STUDIES:    Assessment/Plan  Patient is a 62y old  Male who presents with a chief complaint of fever (27 Dec 2022 06:42)  Continue current care  Diet -   GI/DVT prophylaxis  Analgesia prn  OOB/ambulation on the floor  Incentive spirometry/Cough/Deep breathing exercises  Sequentials           SURGERY Progress Note PA    62 yr old with PMH of HTN, h/o appendectomy presenting with fevers, body aches, diarrhea, has 6 days of fever (T-max 102) headache body aches coughing status post trip to [place].    Patient relates went to urgent care 3 days ago had a negative flu and COVID rapid test but was given Tamiflu anyway in case he still had the flu despite the negative test.    Patient developed nausea and some vomiting after starting Tamiflu he called his doctor yesterday they started him on doxycycline in case he had a bacterial infection.  No sore throat runny nose abdominal pain chest pain short of breath urinary complaints rash neck stiffness or photophobia. Last Tylenol approximately 1.5 hours prior to arrival  PMD Attard    SUBJECTIVE:   Patient seen at bedside, no overnight events, no complaints noted and pain is controlled at this time.   Patient admits to flatus, no bowel movement this AM but no diarrhea.   Patient denies any headaches, chest pain, shortness of breath, nausea, vomiting, fever, chills, weakness, dysuria  Patient A+Ox3 in NAD at time of visit.    OBJECTIVE:   T(C): 36.4 (22 @ 06:00), Max: 39.4 (22 @ 11:55)  HR: 74 (22 @ 06:00) (74 - 112)  BP: 95/56 (22 @ 06:00) (95/56 - 134/86)  RR: 17 (22 @ 06:00) (15 - 22)  SpO2: 96% (22 @ 06:00) (96% - 97%)  CAPILLARY BLOOD GLUCOSE    I&O's Detail    Physical exam:  General: A+O x 3 in NAD  HEENT: PERRLA, EOM intact  Neck: trachea midline  Chest: Clear through auscultation bilaterally, No rales, rhonchi wheezes noted bilaterally  Heart: S1,S1 RRR, no murmurs noted  Abdomen: soft non distended, non tender, BS x 4, no guarding noted  Extremities: no edema noted, warm,  no calf tenderness     MEDICATIONS  (STANDING):  influenza   Vaccine 0.5 milliLiter(s) IntraMuscular once  losartan 50 milliGRAM(s) Oral daily  pantoprazole    Tablet 40 milliGRAM(s) Oral before breakfast  sodium chloride 0.9%. 1000 milliLiter(s) (75 mL/Hr) IV Continuous <Continuous>    MEDICATIONS  (PRN):  ibuprofen  Tablet. 400 milliGRAM(s) Oral every 8 hours PRN Mild Pain (1 - 3)      LABS:                        11.4   10.95 )-----------( 167      ( 27 Dec 2022 07:33 )             34.0         139  |  106  |  26<H>  ----------------------------<  132<H>  4.8   |  27  |  0.89    Ca    8.3<L>      27 Dec 2022 07:33    TPro  7.1  /  Alb  2.7<L>  /  TBili  0.3  /  DBili  x   /  AST  37  /  ALT  112<H>  /  AlkPhos  150<H>      PT/INR - ( 26 Dec 2022 11:34 )   PT: 16.8 sec;   INR: 1.42 ratio         PTT - ( 26 Dec 2022 11:34 )  PTT:41.6 sec  Urinalysis Basic - ( 26 Dec 2022 13:20 )    Color: Yellow / Appearance: Clear / S.020 / pH: x  Gluc: x / Ketone: Moderate  / Bili: Negative / Urobili: Negative mg/dL   Blood: x / Protein: 30 mg/dL / Nitrite: Negative   Leuk Esterase: Trace / RBC: Negative /HPF / WBC 0-2   Sq Epi: x / Non Sq Epi: Occasional / Bacteria: Few    RADIOLOGY & ADDITIONAL STUDIES:    Assessment/Plan  Patient is a 62y old male who presents with a chief complaint of fever (27 Dec 2022 06:42)  Continue current care  Diet -   GI/DVT prophylaxis  Analgesia prn  OOB/ambulation on the floor  Incentive spirometry/Cough/Deep breathing exercises  Sequentials           SURGERY Progress Note PA    62 yr old with PMH of HTN, h/o appendectomy presenting with fevers, body aches, diarrhea, has 6 days of fever (T-max 102) headache body aches coughing status post trip to [place].    Patient relates went to urgent care 3 days ago had a negative flu and COVID rapid test but was given Tamiflu anyway in case he still had the flu despite the negative test.    Patient developed nausea and some vomiting after starting Tamiflu he called his doctor yesterday they started him on doxycycline in case he had a bacterial infection.  No sore throat runny nose abdominal pain chest pain short of breath urinary complaints rash neck stiffness or photophobia. Last Tylenol approximately 1.5 hours prior to arrival  PMD Attard    SUBJECTIVE:   Patient seen at bedside, no overnight events, no complaints noted and pain is controlled at this time.   Patient admits to flatus, no bowel movement this AM but no diarrhea.   Patient denies any headaches, chest pain, shortness of breath, nausea, vomiting, fever, chills, weakness, dysuria  Patient A+Ox3 in NAD at time of visit.   Patient states rash/itching has resolved    OBJECTIVE:   T(C): 36.4 (22 @ 06:00), Max: 39.4 (22 @ 11:55)  HR: 74 (22 @ 06:00) (74 - 112)  BP: 95/56 (22 @ 06:00) (95/56 - 134/86)  RR: 17 (22 @ 06:00) (15 - 22)  SpO2: 96% (22 @ 06:00) (96% - 97%)  CAPILLARY BLOOD GLUCOSE    I&O's Detail    Physical exam:  General: A+O x 3 in NAD  HEENT: PERRLA, EOM intact  Neck: trachea midline  Chest: Clear through auscultation bilaterally, No rales, rhonchi wheezes noted bilaterally  Heart: S1,S1 RRR, no murmurs noted  Abdomen: soft non distended, non tender, BS x 4, no guarding noted  Extremities: no edema noted, warm,  no calf tenderness     MEDICATIONS  (STANDING):  influenza   Vaccine 0.5 milliLiter(s) IntraMuscular once  losartan 50 milliGRAM(s) Oral daily  pantoprazole    Tablet 40 milliGRAM(s) Oral before breakfast  sodium chloride 0.9%. 1000 milliLiter(s) (75 mL/Hr) IV Continuous <Continuous>    MEDICATIONS  (PRN):  ibuprofen  Tablet. 400 milliGRAM(s) Oral every 8 hours PRN Mild Pain (1 - 3)      LABS:                        11.4   10.95 )-----------( 167      ( 27 Dec 2022 07:33 )             34.0         139  |  106  |  26<H>  ----------------------------<  132<H>  4.8   |  27  |  0.89    Ca    8.3<L>      27 Dec 2022 07:33    TPro  7.1  /  Alb  2.7<L>  /  TBili  0.3  /  DBili  x   /  AST  37  /  ALT  112<H>  /  AlkPhos  150<H>      PT/INR - ( 26 Dec 2022 11:34 )   PT: 16.8 sec;   INR: 1.42 ratio         PTT - ( 26 Dec 2022 11:34 )  PTT:41.6 sec  Urinalysis Basic - ( 26 Dec 2022 13:20 )    Color: Yellow / Appearance: Clear / S.020 / pH: x  Gluc: x / Ketone: Moderate  / Bili: Negative / Urobili: Negative mg/dL   Blood: x / Protein: 30 mg/dL / Nitrite: Negative   Leuk Esterase: Trace / RBC: Negative /HPF / WBC 0-2   Sq Epi: x / Non Sq Epi: Occasional / Bacteria: Few    RADIOLOGY & ADDITIONAL STUDIES:    Assessment/Plan  Patient is a 62y old male who presents with a chief complaint of fever (27 Dec 2022 06:42)  Continue current care  Diet - DASH TLC  GI/DVT prophylaxis  Analgesia prn  OOB/ambulation on the floor  Incentive spirometry/Cough/Deep breathing exercises  Sequentials           SURGERY Progress Note PA    62 yr old with PMH of HTN, h/o appendectomy presenting with fevers, body aches, diarrhea, has 6 days of fever (T-max 102) headache body aches coughing status post trip to [place].    Patient relates went to urgent care 3 days ago had a negative flu and COVID rapid test but was given Tamiflu anyway in case he still had the flu despite the negative test.    Patient developed nausea and some vomiting after starting Tamiflu he called his doctor yesterday they started him on doxycycline in case he had a bacterial infection.  No sore throat runny nose abdominal pain chest pain short of breath urinary complaints rash neck stiffness or photophobia. Last Tylenol approximately 1.5 hours prior to arrival  PMD Attard    SUBJECTIVE:   Patient seen at bedside, no overnight events, no complaints noted and pain is controlled at this time.   Patient admits to flatus, no bowel movement this AM but no diarrhea.   Patient denies any headaches, chest pain, shortness of breath, nausea, vomiting, fever, chills, weakness, dysuria  Patient A+Ox3 in NAD at time of visit.   Patient states rash/itching has resolved    OBJECTIVE:   T(C): 36.4 (22 @ 06:00), Max: 39.4 (22 @ 11:55)  HR: 74 (22 @ 06:00) (74 - 112)  BP: 95/56 (22 @ 06:00) (95/56 - 134/86)  RR: 17 (22 @ 06:00) (15 - 22)  SpO2: 96% (22 @ 06:00) (96% - 97%)  CAPILLARY BLOOD GLUCOSE    I&O's Detail    Physical exam:  General: A+O x 3 in NAD  HEENT: PERRLA, EOM intact  Neck: trachea midline  Chest: No rales, rhonchi, some expiratory wheezing noted bilaterally  Heart: S1,S1 RRR, no murmurs noted  Abdomen: soft non distended, non tender, BS x 4, no guarding noted  Extremities: no edema noted, warm,  no calf tenderness     MEDICATIONS  (STANDING):  influenza   Vaccine 0.5 milliLiter(s) IntraMuscular once  losartan 50 milliGRAM(s) Oral daily  pantoprazole    Tablet 40 milliGRAM(s) Oral before breakfast  sodium chloride 0.9%. 1000 milliLiter(s) (75 mL/Hr) IV Continuous <Continuous>    MEDICATIONS  (PRN):  ibuprofen  Tablet. 400 milliGRAM(s) Oral every 8 hours PRN Mild Pain (1 - 3)      LABS:                        11.4   10.95 )-----------( 167      ( 27 Dec 2022 07:33 )             34.0         139  |  106  |  26<H>  ----------------------------<  132<H>  4.8   |  27  |  0.89    Ca    8.3<L>      27 Dec 2022 07:33    TPro  7.1  /  Alb  2.7<L>  /  TBili  0.3  /  DBili  x   /  AST  37  /  ALT  112<H>  /  AlkPhos  150<H>      PT/INR - ( 26 Dec 2022 11:34 )   PT: 16.8 sec;   INR: 1.42 ratio         PTT - ( 26 Dec 2022 11:34 )  PTT:41.6 sec  Urinalysis Basic - ( 26 Dec 2022 13:20 )    Color: Yellow / Appearance: Clear / S.020 / pH: x  Gluc: x / Ketone: Moderate  / Bili: Negative / Urobili: Negative mg/dL   Blood: x / Protein: 30 mg/dL / Nitrite: Negative   Leuk Esterase: Trace / RBC: Negative /HPF / WBC 0-2   Sq Epi: x / Non Sq Epi: Occasional / Bacteria: Few    RADIOLOGY & ADDITIONAL STUDIES:    Assessment/Plan  Patient is a 62y old male who presents with a chief complaint of fever (27 Dec 2022 06:42)  Continue current care  Diet - DASH TLC  GI/DVT prophylaxis  Analgesia prn  OOB/ambulation on the floor  Incentive spirometry/Cough/Deep breathing exercises  Sequentials           SURGERY Progress Note PA    62 yr old with PMH of HTN, h/o appendectomy presenting with fevers, body aches, diarrhea, has 6 days of fever (T-max 102) headache body aches coughing status post trip to [place].    Patient relates went to urgent care 3 days ago had a negative flu and COVID rapid test but was given Tamiflu anyway in case he still had the flu despite the negative test.    Patient developed nausea and some vomiting after starting Tamiflu he called his doctor yesterday they started him on doxycycline in case he had a bacterial infection.  No sore throat runny nose abdominal pain chest pain short of breath urinary complaints rash neck stiffness or photophobia. Last Tylenol approximately 1.5 hours prior to arrival  PMD Attard    SUBJECTIVE:   Patient seen at bedside, no overnight events, no complaints noted and pain is controlled at this time.   Patient admits to flatus, no bowel movement this AM but no diarrhea.   Patient denies any headaches, chest pain, shortness of breath, nausea, vomiting, fever, chills, weakness, dysuria  Patient A+Ox3 in NAD at time of visit.   Patient states rash/itching has resolved    OBJECTIVE:   T(C): 36.4 (22 @ 06:00), Max: 39.4 (22 @ 11:55)  HR: 74 (22 @ 06:00) (74 - 112)  BP: 95/56 (22 @ 06:00) (95/56 - 134/86)  RR: 17 (22 @ 06:00) (15 - 22)  SpO2: 96% (22 @ 06:00) (96% - 97%)  CAPILLARY BLOOD GLUCOSE    I&O's Detail    Physical exam:  General: A+O x 3 in NAD  HEENT: PERRLA, EOM intact  Neck: trachea midline  Chest: No rales, rhonchi, some expiratory wheezing noted bilaterally  Heart: S1,S1 RRR, no murmurs noted  Abdomen: soft non distended, non tender, BS x 4, no guarding noted  Extremities: no edema noted, warm,  no calf tenderness     MEDICATIONS  (STANDING):  influenza   Vaccine 0.5 milliLiter(s) IntraMuscular once  losartan 50 milliGRAM(s) Oral daily  pantoprazole    Tablet 40 milliGRAM(s) Oral before breakfast  sodium chloride 0.9%. 1000 milliLiter(s) (75 mL/Hr) IV Continuous <Continuous>    MEDICATIONS  (PRN):  ibuprofen  Tablet. 400 milliGRAM(s) Oral every 8 hours PRN Mild Pain (1 - 3)      LABS:                        11.4   10.95 )-----------( 167      ( 27 Dec 2022 07:33 )             34.0         139  |  106  |  26<H>  ----------------------------<  132<H>  4.8   |  27  |  0.89    Ca    8.3<L>      27 Dec 2022 07:33    TPro  7.1  /  Alb  2.7<L>  /  TBili  0.3  /  DBili  x   /  AST  37  /  ALT  112<H>  /  AlkPhos  150<H>      PT/INR - ( 26 Dec 2022 11:34 )   PT: 16.8 sec;   INR: 1.42 ratio         PTT - ( 26 Dec 2022 11:34 )  PTT:41.6 sec  Urinalysis Basic - ( 26 Dec 2022 13:20 )    Color: Yellow / Appearance: Clear / S.020 / pH: x  Gluc: x / Ketone: Moderate  / Bili: Negative / Urobili: Negative mg/dL   Blood: x / Protein: 30 mg/dL / Nitrite: Negative   Leuk Esterase: Trace / RBC: Negative /HPF / WBC 0-2   Sq Epi: x / Non Sq Epi: Occasional / Bacteria: Few    RADIOLOGY & ADDITIONAL STUDIES:    Assessment/Plan  Patient is a 62y old male admitted for fevers, abdominal distention, diarrhea, body aches after eating fish  -hepatitis workup given periportal edema (history of hepatitis A years ago)  -stool studies given diarrhea and onset after eating fish  -GI f/u  -ID f/u  -no acute surgical intervention

## 2022-12-27 NOTE — CONSULT NOTE ADULT - CONSULT REASON
abnormal CT   fever
fever
hepatosplenomegaly, LADs
fever  LN
thickened stomach   enlarged liver
[place]
gallbladder wall edema

## 2022-12-27 NOTE — PROGRESS NOTE ADULT - SUBJECTIVE AND OBJECTIVE BOX
Patient is a 62y old  Male who presents with a chief complaint of fever (27 Dec 2022 15:52)      INTERVAL HPI/OVERNIGHT EVENTS:  overnight events noted  Home Medications:  atorvastatin 40 mg oral tablet: 1 tab(s) orally once a day (26 Dec 2022 19:55)  doxycycline hyclate 100 mg oral tablet: 1 tab(s) orally 2 times a day (26 Dec 2022 19:55)  losartan 50 mg oral tablet: 1 tab(s) orally once a day (26 Dec 2022 19:55)  Tamiflu:  (26 Dec 2022 19:55)      MEDICATIONS  (STANDING):  influenza   Vaccine 0.5 milliLiter(s) IntraMuscular once  losartan 50 milliGRAM(s) Oral daily  pantoprazole    Tablet 40 milliGRAM(s) Oral before breakfast  sodium chloride 0.9%. 1000 milliLiter(s) (75 mL/Hr) IV Continuous <Continuous>    MEDICATIONS  (PRN):  acetaminophen     Tablet .. 650 milliGRAM(s) Oral every 6 hours PRN Mild Pain (1 - 3)  ibuprofen  Tablet. 400 milliGRAM(s) Oral every 8 hours PRN Mild Pain (1 - 3)      Allergies    Vicodin (Hives)    Intolerances        REVIEW OF SYSTEMS:  CONSTITUTIONAL: No fever, weight loss, or fatigue  EYES: No eye pain, visual disturbances, or discharge  ENMT:  No difficulty hearing, tinnitus, vertigo; No sinus or throat pain  NECK: No pain or stiffness  BREASTS: No pain, masses, or nipple discharge  RESPIRATORY: No cough, wheezing, chills or hemoptysis; No shortness of breath  CARDIOVASCULAR: No chest pain, palpitations, dizziness, or leg swelling  GASTROINTESTINAL: No abdominal or epigastric pain. No nausea, vomiting, or hematemesis; No diarrhea or constipation. No melena or hematochezia.  GENITOURINARY: No dysuria, frequency, hematuria, or incontinence  NEUROLOGICAL: has headaches, no memory loss, loss of strength, numbness, or tremors  SKIN: No itching, burning, rashes, or lesions   LYMPH NODES: No enlarged glands  ENDOCRINE: No heat or cold intolerance; No hair loss  MUSCULOSKELETAL: No joint pain or swelling; No muscle, back, or extremity pain  PSYCHIATRIC: No depression, anxiety, mood swings, or difficulty sleeping  HEME/LYMPH: No easy bruising, or bleeding gums  ALLERGY AND IMMUNOLOGIC: No hives or eczema    Vital Signs Last 24 Hrs  T(C): 36.4 (27 Dec 2022 11:59), Max: 37.4 (26 Dec 2022 16:00)  T(F): 97.6 (27 Dec 2022 11:59), Max: 99.3 (26 Dec 2022 16:00)  HR: 74 (27 Dec 2022 11:59) (74 - 90)  BP: 100/57 (27 Dec 2022 11:59) (95/56 - 114/65)  BP(mean): --  RR: 17 (27 Dec 2022 11:59) (15 - 18)  SpO2: 94% (27 Dec 2022 11:59) (94% - 97%)    Parameters below as of 27 Dec 2022 11:59  Patient On (Oxygen Delivery Method): room air        PHYSICAL EXAM:  GENERAL: NAD, well-groomed, well-developed  HEAD:  Atraumatic, Normocephalic  EYES: EOMI, PERRLA, conjunctiva and sclera clear  ENMT: Moist mucous membranes,   NECK: Supple, No JVD, Normal thyroid  NERVOUS SYSTEM:  Alert & Oriented X3, Good concentration; Motor Strength 5/5 B/L upper and lower extremities; DTRs 2+ intact and symmetric  CHEST/LUNG: Clear to percussion bilaterally; No rales, rhonchi, wheezing, or rubs  HEART: Regular rate and rhythm; No murmurs, rubs, or gallops  ABDOMEN: Soft, Nontender, Nondistended; Bowel sounds present  EXTREMITIES:  2+ Peripheral Pulses, No clubbing, cyanosis, or edema  LYMPH: No lymphadenopathy noted  SKIN: No rashes or lesions    LABS:                        11.4   10.95 )-----------( 167      ( 27 Dec 2022 07:33 )             34.0         139  |  106  |  26<H>  ----------------------------<  132<H>  4.8   |  27  |  0.89    Ca    8.3<L>      27 Dec 2022 07:33    TPro  7.1  /  Alb  2.7<L>  /  TBili  0.3  /  DBili  x   /  AST  37  /  ALT  112<H>  /  AlkPhos  150<H>      PT/INR - ( 26 Dec 2022 11:34 )   PT: 16.8 sec;   INR: 1.42 ratio         PTT - ( 26 Dec 2022 11:34 )  PTT:41.6 sec  Urinalysis Basic - ( 26 Dec 2022 13:20 )    Color: Yellow / Appearance: Clear / S.020 / pH: x  Gluc: x / Ketone: Moderate  / Bili: Negative / Urobili: Negative mg/dL   Blood: x / Protein: 30 mg/dL / Nitrite: Negative   Leuk Esterase: Trace / RBC: Negative /HPF / WBC 0-2   Sq Epi: x / Non Sq Epi: Occasional / Bacteria: Few      CAPILLARY BLOOD GLUCOSE              I&O's Summary      RADIOLOGY & ADDITIONAL TESTS:    Imaging Personally Reviewed:  [x ] YES  [ ] NO    Consultant(s) Notes Reviewed:  [ x] YES  [ ] NO    Care Discussed with Consultants/Other Providers [x ] YES  [ ] NO

## 2022-12-27 NOTE — PROGRESS NOTE ADULT - ASSESSMENT
62 yr old with PMH of HTN ,h/o appendectomy presenting with fevers, body aches, diarrhea, hasf 6 days of fever (T-max 102) headache body aches coughing status post trip to [place].  Patient relates went to urgent care 3 days ago had a negative flu and COVID rapid test but was given Tamiflu anyway in case he still had the flu despite the negative test.  Patient developed nausea and some vomiting after starting Tamiflu he called his doctor yesterday they started him on doxycycline in case he had a bacterial infection.  No sore throat runny nose abdominal pain chest pain short of breath urinary complaints rash neck stiffness or photophobia. Last Tylenol approximately 1.5 hours prior to arrival  .PMD Attard   In ED febrile, with tachycadia, tachypnea, no leucocytosis, has lymphopenia,elevated LFT( as per patient, FT are chronically high, no cause found), had US abd -diffuse GB edema and periportal adenopathy,seen by surg- has elevated bun creat , had CT abd chest-< from: CT Chest w/ IV Cont (12.26.22 @ 17:01) >  Diffuse thoracic and abdominopelvic lymphadenopathy. Mild splenomegaly.   Consideration is given to a infectious/inflammatory process versus a   neoplastic process such as lymphoproliferative disease.  Mild hepatomegaly. Periportal edema, a nonspecific finding that may be   seen with hepatitis or aggressive fluid resuscitation. Correlate with   liver function tests.  Underdistended gallbladder. Edematous gallbladder wall thickening, also   nonspecific and may be related to underlying liver pathology.  Gastric wall thickening versus underdistention, possibly representing   gastritis. Consider correlation with upper endoscopy to assess for   underlying neoplasm.  A few small nonspecific pulmonary nodules. Continued follow-up is   recommended.  Small right and trace left pleural effusions.    Small volume ascites.    Being admitted for further management     ID GI , hemeonc and pulmonary opinion sought

## 2022-12-28 DIAGNOSIS — R50.9 FEVER, UNSPECIFIED: ICD-10-CM

## 2022-12-28 LAB
ALBUMIN SERPL ELPH-MCNC: 2.9 G/DL — LOW (ref 3.3–5)
ALP SERPL-CCNC: 132 U/L — HIGH (ref 30–120)
ALT FLD-CCNC: 99 U/L DA — HIGH (ref 10–60)
ANION GAP SERPL CALC-SCNC: 7 MMOL/L — SIGNIFICANT CHANGE UP (ref 5–17)
AST SERPL-CCNC: 52 U/L — HIGH (ref 10–40)
BASOPHILS # BLD AUTO: 0.11 K/UL — SIGNIFICANT CHANGE UP (ref 0–0.2)
BASOPHILS NFR BLD AUTO: 1 % — SIGNIFICANT CHANGE UP (ref 0–2)
BILIRUB SERPL-MCNC: 0.4 MG/DL — SIGNIFICANT CHANGE UP (ref 0.2–1.2)
BUN SERPL-MCNC: 18 MG/DL — SIGNIFICANT CHANGE UP (ref 7–23)
CALCIUM SERPL-MCNC: 8 MG/DL — LOW (ref 8.4–10.5)
CHLORIDE SERPL-SCNC: 107 MMOL/L — SIGNIFICANT CHANGE UP (ref 96–108)
CMV IGG FLD QL: <0.2 U/ML — SIGNIFICANT CHANGE UP
CMV IGG SERPL-IMP: NEGATIVE — SIGNIFICANT CHANGE UP
CMV IGM FLD-ACNC: <8 AU/ML — SIGNIFICANT CHANGE UP
CMV IGM SERPL QL: NEGATIVE — SIGNIFICANT CHANGE UP
CO2 SERPL-SCNC: 26 MMOL/L — SIGNIFICANT CHANGE UP (ref 22–31)
CREAT SERPL-MCNC: 0.98 MG/DL — SIGNIFICANT CHANGE UP (ref 0.5–1.3)
CULTURE RESULTS: NO GROWTH — SIGNIFICANT CHANGE UP
EBV EA AB SER IA-ACNC: 16 U/ML — HIGH
EBV EA AB TITR SER IF: POSITIVE
EBV EA IGG SER-ACNC: POSITIVE
EBV NA IGG SER IA-ACNC: >600 U/ML — HIGH
EBV PATRN SPEC IB-IMP: SIGNIFICANT CHANGE UP
EBV VCA IGG AVIDITY SER QL IA: POSITIVE
EBV VCA IGM SER IA-ACNC: 10.4 U/ML — SIGNIFICANT CHANGE UP
EBV VCA IGM SER IA-ACNC: 364 U/ML — HIGH
EBV VCA IGM TITR FLD: NEGATIVE — SIGNIFICANT CHANGE UP
EGFR: 87 ML/MIN/1.73M2 — SIGNIFICANT CHANGE UP
EOSINOPHIL # BLD AUTO: 1.73 K/UL — HIGH (ref 0–0.5)
EOSINOPHIL NFR BLD AUTO: 15.8 % — HIGH (ref 0–6)
GLUCOSE SERPL-MCNC: 88 MG/DL — SIGNIFICANT CHANGE UP (ref 70–99)
HAV IGM SER-ACNC: SIGNIFICANT CHANGE UP
HBV CORE IGM SER-ACNC: SIGNIFICANT CHANGE UP
HBV SURFACE AG SER-ACNC: SIGNIFICANT CHANGE UP
HCT VFR BLD CALC: 35.3 % — LOW (ref 39–50)
HCV AB S/CO SERPL IA: 0.1 S/CO — SIGNIFICANT CHANGE UP (ref 0–0.99)
HCV AB SERPL-IMP: SIGNIFICANT CHANGE UP
HGB BLD-MCNC: 11.6 G/DL — LOW (ref 13–17)
IMM GRANULOCYTES NFR BLD AUTO: 0.4 % — SIGNIFICANT CHANGE UP (ref 0–0.9)
LYMPHOCYTES # BLD AUTO: 19.5 % — SIGNIFICANT CHANGE UP (ref 13–44)
LYMPHOCYTES # BLD AUTO: 2.14 K/UL — SIGNIFICANT CHANGE UP (ref 1–3.3)
MCHC RBC-ENTMCNC: 28.2 PG — SIGNIFICANT CHANGE UP (ref 27–34)
MCHC RBC-ENTMCNC: 32.9 GM/DL — SIGNIFICANT CHANGE UP (ref 32–36)
MCV RBC AUTO: 85.7 FL — SIGNIFICANT CHANGE UP (ref 80–100)
MONOCYTES # BLD AUTO: 1 K/UL — HIGH (ref 0–0.9)
MONOCYTES NFR BLD AUTO: 9.1 % — SIGNIFICANT CHANGE UP (ref 2–14)
NEUTROPHILS # BLD AUTO: 5.96 K/UL — SIGNIFICANT CHANGE UP (ref 1.8–7.4)
NEUTROPHILS NFR BLD AUTO: 54.2 % — SIGNIFICANT CHANGE UP (ref 43–77)
NRBC # BLD: 0 /100 WBCS — SIGNIFICANT CHANGE UP (ref 0–0)
PLATELET # BLD AUTO: 236 K/UL — SIGNIFICANT CHANGE UP (ref 150–400)
POTASSIUM SERPL-MCNC: 4.7 MMOL/L — SIGNIFICANT CHANGE UP (ref 3.5–5.3)
POTASSIUM SERPL-SCNC: 4.7 MMOL/L — SIGNIFICANT CHANGE UP (ref 3.5–5.3)
PROT SERPL-MCNC: 7.2 G/DL — SIGNIFICANT CHANGE UP (ref 6–8.3)
RBC # BLD: 4.12 M/UL — LOW (ref 4.2–5.8)
RBC # FLD: 16.8 % — HIGH (ref 10.3–14.5)
SODIUM SERPL-SCNC: 140 MMOL/L — SIGNIFICANT CHANGE UP (ref 135–145)
SPECIMEN SOURCE: SIGNIFICANT CHANGE UP
WBC # BLD: 10.98 K/UL — HIGH (ref 3.8–10.5)
WBC # FLD AUTO: 10.98 K/UL — HIGH (ref 3.8–10.5)

## 2022-12-28 PROCEDURE — 99233 SBSQ HOSP IP/OBS HIGH 50: CPT

## 2022-12-28 PROCEDURE — 76882 US LMTD JT/FCL EVL NVASC XTR: CPT | Mod: 26,RT

## 2022-12-28 RX ORDER — ALPRAZOLAM 0.25 MG
0.25 TABLET ORAL ONCE
Refills: 0 | Status: DISCONTINUED | OUTPATIENT
Start: 2022-12-28 | End: 2022-12-28

## 2022-12-28 RX ORDER — IPRATROPIUM/ALBUTEROL SULFATE 18-103MCG
3 AEROSOL WITH ADAPTER (GRAM) INHALATION EVERY 6 HOURS
Refills: 0 | Status: DISCONTINUED | OUTPATIENT
Start: 2022-12-28 | End: 2022-12-29

## 2022-12-28 RX ADMIN — Medication 100 MILLIGRAM(S): at 16:16

## 2022-12-28 RX ADMIN — Medication 100 MILLIGRAM(S): at 06:24

## 2022-12-28 RX ADMIN — LOSARTAN POTASSIUM 50 MILLIGRAM(S): 100 TABLET, FILM COATED ORAL at 06:24

## 2022-12-28 RX ADMIN — Medication 3 MILLILITER(S): at 20:25

## 2022-12-28 RX ADMIN — Medication 400 MILLIGRAM(S): at 09:46

## 2022-12-28 RX ADMIN — Medication 650 MILLIGRAM(S): at 22:30

## 2022-12-28 RX ADMIN — PANTOPRAZOLE SODIUM 40 MILLIGRAM(S): 20 TABLET, DELAYED RELEASE ORAL at 06:24

## 2022-12-28 RX ADMIN — Medication 100 MILLIGRAM(S): at 06:25

## 2022-12-28 RX ADMIN — Medication 400 MILLIGRAM(S): at 08:46

## 2022-12-28 RX ADMIN — Medication 650 MILLIGRAM(S): at 08:45

## 2022-12-28 RX ADMIN — Medication 650 MILLIGRAM(S): at 15:29

## 2022-12-28 RX ADMIN — Medication 100 MILLIGRAM(S): at 00:15

## 2022-12-28 RX ADMIN — Medication 100 MILLIGRAM(S): at 21:29

## 2022-12-28 RX ADMIN — Medication 650 MILLIGRAM(S): at 16:29

## 2022-12-28 RX ADMIN — SODIUM CHLORIDE 75 MILLILITER(S): 9 INJECTION INTRAMUSCULAR; INTRAVENOUS; SUBCUTANEOUS at 21:27

## 2022-12-28 RX ADMIN — Medication 0.25 MILLIGRAM(S): at 21:28

## 2022-12-28 RX ADMIN — Medication 650 MILLIGRAM(S): at 09:45

## 2022-12-28 RX ADMIN — Medication 650 MILLIGRAM(S): at 21:28

## 2022-12-28 RX ADMIN — Medication 400 MILLIGRAM(S): at 22:30

## 2022-12-28 RX ADMIN — Medication 400 MILLIGRAM(S): at 21:28

## 2022-12-28 RX ADMIN — Medication 5 MILLIGRAM(S): at 00:15

## 2022-12-28 NOTE — PROGRESS NOTE ADULT - ASSESSMENT
61 y/o man with PMH of HTN ,h/o appendectomy. Recently visited family in [place], reports ate out last Wed, dev hives, returned to  on Thurs, dev fever uo to 102. Also w malaises poor po, cough w clear fluid. Was seen in Urgent Care Friday, COVID and Flu tests negative but given Temaflu, also took antibiotics called in by covering PMD and had diarrhea(resolved), then came to ER  CT c/a/p-above and below diaphragm LADs, including axillary, hilar, precarinal, portal caval, gastrohepatic, paraarotic(largest 4.3x2.2cm portocaval, also hepatosplenomegaly spleen 14.8cm liver 18.1cm. Small subcentimeter pulm nodules. Small right and trace left effusion. subcentimeter hypodense liver lesions    -pt w nonspecific malaise x few weeks, ~3 days of fever. CT c/a/p w mild hepatosplenomegaly, above and below diaphragm LADs up to 4+cm protocaval  -adm labs also w mild elevated LFT that has improved  -seen by Pulm, GI, Surgery    EBV IgG abs positive.   ? old infeciton vs reactivation    -suspicion for malignancy, needs tissue dx, best would be LN excisional bx(left axillar was noted as 2/7cm on CT but not palpable by me)    Fever defervescing  Rash resolved lasts week.     RECOMMENDATION:     obtain axilla US  consider neuro eval for HA    if axilla US show resolving LN or shrinking LN then DC home with followup in my office in next 2-3wks, and to repaet imaigng outpt  If axilla LN enlarged, then recommend IR guided bx    Xanax 0.25mg once to see if helps with sleep    DVT prophylaxis  OOT as yves.     Check hepatitis serologies  check ALIZA    further Heme/Onc recommendations pending above    thank you, will follow w you

## 2022-12-28 NOTE — PROGRESS NOTE ADULT - TIME BILLING
Obtaining history/physical exam, reviewing labs and imaging studies, coordinating care with multidisciplinary team and nursing staff and discussing patient with surgical PA covering. Greater than 50% of the time was spent with direct face to face patient interaction, discussing with the patient and family, and answering all questions.
Obtaining history/physical exam, reviewing labs and imaging studies, coordinating care with multidisciplinary team and nursing staff and discussing patient with surgical PA covering. Greater than 50% of the time was spent with direct face to face patient interaction, discussing with the patient and family, and answering all questions.

## 2022-12-28 NOTE — PROGRESS NOTE ADULT - SUBJECTIVE AND OBJECTIVE BOX
Date/Time Patient Seen:  		  Referring MD:   Data Reviewed	       Patient is a 62y old  Male who presents with a chief complaint of fever (27 Dec 2022 15:52)      Subjective/HPI     PAST MEDICAL & SURGICAL HISTORY:        Medication list         MEDICATIONS  (STANDING):  heparin   Injectable 5000 Unit(s) SubCutaneous every 12 hours  influenza   Vaccine 0.5 milliLiter(s) IntraMuscular once  losartan 50 milliGRAM(s) Oral daily  pantoprazole    Tablet 40 milliGRAM(s) Oral before breakfast  sodium chloride 0.9%. 1000 milliLiter(s) (75 mL/Hr) IV Continuous <Continuous>    MEDICATIONS  (PRN):  acetaminophen     Tablet .. 650 milliGRAM(s) Oral every 6 hours PRN Mild Pain (1 - 3)  benzonatate 100 milliGRAM(s) Oral every 8 hours PRN Cough  guaiFENesin Oral Liquid (Sugar-Free) 100 milliGRAM(s) Oral every 6 hours PRN Cough  ibuprofen  Tablet. 400 milliGRAM(s) Oral every 8 hours PRN Mild Pain (1 - 3)  melatonin 5 milliGRAM(s) Oral at bedtime PRN Insomnia         Vitals log        ICU Vital Signs Last 24 Hrs  T(C): 36.9 (28 Dec 2022 05:46), Max: 36.9 (28 Dec 2022 05:46)  T(F): 98.4 (28 Dec 2022 05:46), Max: 98.4 (28 Dec 2022 05:46)  HR: 81 (28 Dec 2022 05:46) (69 - 81)  BP: 102/64 (28 Dec 2022 05:46) (100/57 - 114/67)  BP(mean): --  ABP: --  ABP(mean): --  RR: 16 (28 Dec 2022 05:46) (16 - 18)  SpO2: 94% (28 Dec 2022 05:46) (92% - 94%)    O2 Parameters below as of 28 Dec 2022 05:46  Patient On (Oxygen Delivery Method): room air                 Input and Output:  I&O's Detail      Lab Data                        11.4   10.95 )-----------( 167      ( 27 Dec 2022 07:33 )             34.0     12-27    139  |  106  |  26<H>  ----------------------------<  132<H>  4.8   |  27  |  0.89    Ca    8.3<L>      27 Dec 2022 07:33    TPro  7.1  /  Alb  2.7<L>  /  TBili  0.3  /  DBili  x   /  AST  37  /  ALT  112<H>  /  AlkPhos  150<H>  12-27            Review of Systems	      Objective     Physical Examination    heart s1s2  lung dc BS  head nc      Pertinent Lab findings & Imaging      [place]:  NO   Adequate UO     I&O's Detail           Discussed with:     Cultures:	        Radiology

## 2022-12-28 NOTE — PROGRESS NOTE ADULT - SUBJECTIVE AND OBJECTIVE BOX
Patient is a 62y old  Male who presents with a chief complaint of fever (28 Dec 2022 12:43)      INTERVAL HPI/OVERNIGHT EVENTS:    Home Medications:  atorvastatin 40 mg oral tablet: 1 tab(s) orally once a day (26 Dec 2022 19:55)  doxycycline hyclate 100 mg oral tablet: 1 tab(s) orally 2 times a day (26 Dec 2022 19:55)  losartan 50 mg oral tablet: 1 tab(s) orally once a day (26 Dec 2022 19:55)  Tamiflu:  (26 Dec 2022 19:55)      MEDICATIONS  (STANDING):  ALPRAZolam 0.25 milliGRAM(s) Oral once  heparin   Injectable 5000 Unit(s) SubCutaneous every 12 hours  influenza   Vaccine 0.5 milliLiter(s) IntraMuscular once  losartan 50 milliGRAM(s) Oral daily  pantoprazole    Tablet 40 milliGRAM(s) Oral before breakfast  sodium chloride 0.9%. 1000 milliLiter(s) (75 mL/Hr) IV Continuous <Continuous>    MEDICATIONS  (PRN):  acetaminophen     Tablet .. 650 milliGRAM(s) Oral every 6 hours PRN Mild Pain (1 - 3)  albuterol/ipratropium for Nebulization 3 milliLiter(s) Nebulizer every 6 hours PRN Bronchospasm  benzonatate 100 milliGRAM(s) Oral every 8 hours PRN Cough  guaiFENesin Oral Liquid (Sugar-Free) 100 milliGRAM(s) Oral every 6 hours PRN Cough  ibuprofen  Tablet. 400 milliGRAM(s) Oral every 8 hours PRN Mild Pain (1 - 3)  melatonin 5 milliGRAM(s) Oral at bedtime PRN Insomnia      Allergies    Vicodin (Hives)    Intolerances        REVIEW OF SYSTEMS:  CONSTITUTIONAL: fever,no weight loss, has fatigue  EYES: No eye pain, visual disturbances, or discharge  ENMT:  No difficulty hearing, tinnitus, vertigo; No sinus or throat pain  NECK: No pain or stiffness  BREASTS: No pain, masses, or nipple discharge  RESPIRATORY: intermittent  cough, wheezing, CARDIOVASCULAR: No chest pain, palpitations, dizziness, or leg swelling  GASTROINTESTINAL: No abdominal or epigastric pain. No nausea, vomiting, or hematemesis; No diarrhea or constipation. No melena or hematochezia.  GENITOURINARY: No dysuria, frequency, hematuria, or incontinence  NEUROLOGICAL: No headaches, memory loss, loss of strength, numbness, or tremors  SKIN: No itching, burning, rashes, or lesions   LYMPH NODES: No enlarged glands  ENDOCRINE: No heat or cold intolerance; No hair loss  MUSCULOSKELETAL: No joint pain or swelling; No muscle, back, or extremity pain  PSYCHIATRIC: No depression, anxiety, mood swings, or difficulty sleeping  HEME/LYMPH: No easy bruising, or bleeding gums  ALLERGY AND IMMUNOLOGIC: No hives or eczema    Vital Signs Last 24 Hrs  T(C): 36.4 (28 Dec 2022 11:18), Max: 36.9 (28 Dec 2022 05:46)  T(F): 97.6 (28 Dec 2022 11:18), Max: 98.4 (28 Dec 2022 05:46)  HR: 81 (28 Dec 2022 11:18) (69 - 81)  BP: 122/64 (28 Dec 2022 11:18) (102/64 - 122/64)  BP(mean): --  RR: 17 (28 Dec 2022 11:18) (16 - 18)  SpO2: 96% (28 Dec 2022 11:18) (92% - 96%)    Parameters below as of 28 Dec 2022 05:46  Patient On (Oxygen Delivery Method): room air        PHYSICAL EXAM:  GENERAL: NAD, well-groomed, well-developed  HEAD:  Atraumatic, Normocephalic  EYES: EOMI, PERRLA, conjunctiva and sclera clear  ENMT: Moist mucous membranes,   NECK: Supple, No JVD, Normal thyroid  NERVOUS SYSTEM:  Alert & Oriented X3, non focal  CHEST/LUNG: Clear to percussion bilaterally; No rales, rhonchi, wheezing, or rubs  HEART: Regular rate and rhythm; No murmurs, rubs, or gallops  ABDOMEN: Soft, Nontender, Nondistended; Bowel sounds present  EXTREMITIES:  2+ Peripheral Pulses, No clubbing, cyanosis, or edema  LABS:                        11.6   10.98 )-----------( 236      ( 28 Dec 2022 07:00 )             35.3     12-28    140  |  107  |  18  ----------------------------<  88  4.7   |  26  |  0.98    Ca    8.0<L>      28 Dec 2022 07:00    TPro  7.2  /  Alb  2.9<L>  /  TBili  0.4  /  DBili  x   /  AST  52<H>  /  ALT  99<H>  /  AlkPhos  132<H>  12-28        CAPILLARY BLOOD GLUCOSE            Culture - Blood (collected 12-26-22 @ 11:34)  Source: .Blood Blood-Peripheral  Preliminary Report (12-27-22 @ 17:01):    No growth to date.    Culture - Blood (collected 12-26-22 @ 11:34)  Source: .Blood Blood-Peripheral  Preliminary Report (12-27-22 @ 17:01):    No growth to date.        I&O's Summary      RADIOLOGY & ADDITIONAL TESTS:    Imaging Personally Reviewed:  [x ] YES  [ ] NO    Consultant(s) Notes Reviewed:  [x ] YES  [ ] NO    Care Discussed with Consultants/Other Providers [ x] YES  [ ] NO

## 2022-12-28 NOTE — PROGRESS NOTE ADULT - SUBJECTIVE AND OBJECTIVE BOX
[facility]  ANH Sandoval Y. Patel, S. Shah, G. Casimir  [phone]  ([phone] - weekdays after 5pm and weekends)    Name: RIANA PETERSON  Age/Gender: 62y Male  MRN: [id number]    Interval History:  Patient seen and examined.  Denies fevers/chills but sweats when laying down  Has not had any BM, no abd pain, n/v  Still has a headache, not sleeping well, trying to eat  Notes reviewed. Afebrile.   Allergies: Vicodin (Hives)    Objective:  Vitals:   T(F): 97.6 (22 @ 11:18), Max: 98.4 (22 @ 05:46)  HR: 81 (22 @ 11:18) (69 - 81)  BP: 122/64 (22 @ 11:18) (102/64 - 122/64)  RR: 17 (22 @ 11:18) (16 - 18)  SpO2: 96% (22 @ 11:18) (92% - 96%)  Physical Examination:  General: no acute distress  HEENT: NC/AT, EOMI, anicteric, neck supple  Cardio: S1, S2 present, normal rate and rhythm  Resp: clear to auscultation bilaterally  Abd: soft, NT, distended, + BS  Neuro: AAOx3, no obvious focal deficits  Ext: no edema, no cyanosis, moving extremities  Skin: warm, dry, no visible rash  Psych: appropriate affect and mood for situation  Lines: PIV    Laboratory Studies:  CBC:                       11.6   10.98 )-----------( 236      ( 28 Dec 2022 07:00 )             35.3     WBC Trend:  10.98 22 @ 07:00  10.95 22 @ 07:33  9.27 22 @ 11:34    CMP:     140  |  107  |  18  ----------------------------<  88  4.7   |  26  |  0.98    Ca    8.0<L>      28 Dec 2022 07:00    TPro  7.2  /  Alb  2.9<L>  /  TBili  0.4  /  DBili  x   /  AST  52<H>  /  ALT  99<H>  /  AlkPhos  132<H>      Creatinine, Serum: 0.98 mg/dL (22 @ 07:00)  Creatinine, Serum: 0.89 mg/dL (22 @ 07:33)  Creatinine, Serum: 1.39 mg/dL (22 @ 11:34)      LIVER FUNCTIONS - ( 28 Dec 2022 07:00 )  Alb: 2.9 g/dL / Pro: 7.2 g/dL / ALK PHOS: 132 U/L / ALT: 99 U/L DA / AST: 52 U/L / GGT: x           Acute Hepatitis Panel (22 @ 16:35)    Hepatitis C Virus Interpretation: Nonreact: Hepatitis C AB  S/CO Ratio                        Interpretation  < 1.00                                   Non-Reactive  1.00 - 4.99                         Weakly-Reactive  >= 5.00                                Reactive  Non-Reactive: A person witha non-reactive HCV antibody result is  considered uninfected.  No further action is needed unless recent  infection is suspected.  In these cases, consider repeat testing later to  detect seroconversion..  Weakly-Reactive: HCV antibody test is abnormal, HCV RNA Qualitative test  will follow.  Reactive: HCV antibody test is abnormal, HCV RNA Qualitative test will  follow.  Note: HCV antibody testing is performed on the Abbott  system.    Hepatitis C Virus S/CO Ratio: 0.10 S/CO    Hepatitis B Core IgM Antibody: Nonreact    Hepatitis B Surface Antigen: Nonreact    Hepatitis A IgM Antibody: Nonreact    Cytomegalovirus IgG Antibody, Serum (22 @ 16:35)    CMV IgG Antibody: <0.20 U/mL    CMV IgG Interpretation: Negative    CMV IgM Antibody: <8.0 AU/mL (22 @ 16:35)  CMV IgM Interpretation: Negative:    Valentina-Barr Virus Serologic Test (22 @ 16:35)    Valentina-Barr Virus Capsid Antigen IgG: Positive: Valentina-Barr Virus VCA IgG Antibody  Method: Liaison Chemiluminescent Immunoassay  Reference Range: (Expressed in U/mL)       Negative        < 18.0 U/mL       Equivocal      18.0 - 21.9 U/mL       Positive         >= 22.0 U/mL    Valentina-Barr Nuclear Antigen: Positive: Valentina-Barr Virus NA IgG Antibody  Method: Liaison Chemiluminescent Immunoassay  Reference Range: (Expressed in U/mL)       Negative        < 18.0 U/mL       Equivocal      18.0 - 21.9 U/mL       Positive         >= 22.0 U/mL    Valentina Barr Virus IgM Antibody: Negative: Valentina-Barr Virus VCA IgM Antibody  Method: Liaison Chemiluminescent Immunoassay  Reference Range: (Expressed in U/mL)       Negative     < 36.0 U/mL       Equivocal    36.0 - 43.9 U/mL       Positive       >= 44.0 U/mL    Valentina-Barr Virus Early Antigen: Positive: Valentina-Barr Virus EA IgG Antibody  Method: Liaison Chemiluminescent Immunoassay  Reference Range: (Expressed in U/mL)       Negative        < 9.0 U/mL       Equivocal       9.0 - 10.9 U/mL       Positive          >= 11.0 U/mL    EBV Interpretation: See Note: Interpretation of EBV-Specific Antibody Results  No previous infection  EBV VCA IgM = Negative  EBV VCA IgG  = Negative  EBV EA IgG     = Negative  EBV NA IgG    = Negative  Acute/primary infection  EBV VCA IgM = Positive  EBV VCA IgG  = Positive  EBV EA IgG     = Positive/Negative  EBV NA IgG    = Negative  Recent infection  EBV VCA IgM = Positive/Negative  EBV VCA IgG  = Positive  EBV EA IgG     = Positive/Negative  EBV NA IgG    = Positive/Negative  Past infection  EBV VCA IgM = Negative  EBV VCA IgG  = Positive  EBV EA IgG     = Negative  EBV NA IgG    = Positive  Reactivation  EBV VCA IgM = Positive/Negative  EBV VCA IgG  = Positive  EBV EA IgG     = Positive  EBV NA IgG    = Positive  VCA = Viral capsid antigen  EA = Early antigen  NA =Nuclear antigen    EBV VCA IgG EIA: 364.0 U/mL    EBNA IgG EIA: >600.0 U/mL    EBV VCA IgM EIA: 10.4 U/mL    EBV EA Ab EIA: 16.0 U/mL    Urinalysis Basic - ( 26 Dec 2022 13:20 )  Color: Yellow / Appearance: Clear / S.020 / pH: x  Gluc: x / Ketone: Moderate  / Bili: Negative / Urobili: Negative mg/dL   Blood: x / Protein: 30 mg/dL / Nitrite: Negative   Leuk Esterase: Trace / RBC: Negative /HPF / WBC 0-2   Cytomegalovirus IgM Antibody, Serum (22 @ 16:35)    CMV IgM Antibody: <8.0 AU/mL    CMV IgM Interpretation: Negative: Method: Liaison Chemiluminescent Immunoassay  Reference ranges: (values expressed as AU/mL)              Negative     < 30.0 AU/mL  Cytomegalovirus IgM Antibody, Serum (22 @ 16:35)    CMV IgM Antibody: <8.0 AU/mL    CMV IgM Interpretation: Negative: Method: Liaison Chemiluminescent Immunoassay  Reference ranges: (values expressed as AU/mL)              Negative     < 30.0 AU/mL              Equivocal     30.0 - 34.9 AU/mL              Positive      > 35.0 AU/mL                Equivocal     30.0 - 34.9 AU/mL              Positive      > 35.0 AU/mL    Sq Epi: x / Non Sq Epi: Occasional / Bacteria: Few    Microbiology: reviewed   Culture - Blood (collected 22 @ 11:34)  Source: .Blood Blood-Peripheral  Preliminary Report (22 @ 17:01):    No growth to date.    Culture - Blood (collected 22 @ 11:34)  Source: .Blood Blood-Peripheral  Preliminary Report (22 @ 17:01):    No growth to date.    SARS-CoV-2 Result: NotDetec (26 Dec 2022 11:34)    Radiology: reviewed     Medications:  acetaminophen     Tablet .. 650 milliGRAM(s) Oral every 6 hours PRN  albuterol/ipratropium for Nebulization 3 milliLiter(s) Nebulizer every 6 hours PRN  ALPRAZolam 0.25 milliGRAM(s) Oral once  benzonatate 100 milliGRAM(s) Oral every 8 hours PRN  guaiFENesin Oral Liquid (Sugar-Free) 100 milliGRAM(s) Oral every 6 hours PRN  heparin   Injectable 5000 Unit(s) SubCutaneous every 12 hours  ibuprofen  Tablet. 400 milliGRAM(s) Oral every 8 hours PRN  influenza   Vaccine 0.5 milliLiter(s) IntraMuscular once  losartan 50 milliGRAM(s) Oral daily  melatonin 5 milliGRAM(s) Oral at bedtime PRN  pantoprazole    Tablet 40 milliGRAM(s) Oral before breakfast  sodium chloride 0.9%. 1000 milliLiter(s) IV Continuous <Continuous>

## 2022-12-28 NOTE — PROGRESS NOTE ADULT - SUBJECTIVE AND OBJECTIVE BOX
SURGERY Progress Note PA    62 yr old with PMH of HTN, h/o appendectomy presenting with fevers, body aches, diarrhea, has 6 days of fever (T-max 102) headache body aches coughing status post trip to [place].    SUBJECTIVE:   Patient seen at bedside, no overnight events, complains of wheezing.  Pain is controlled at this time.   Patient admits to flatus, bowel movement.   Patient denies any headaches, chest pain, shortness of breath, nausea, vomiting, fever, chills, weakness, dysuria  Patient A+Ox3 in NAD at time of visit.    OBJECTIVE:   T(C): 36.9 (22 @ 05:46), Max: 36.9 (22 @ 05:46)  HR: 81 (22 @ 05:46) (69 - 81)  BP: 102/64 (22 @ 05:46) (100/57 - 114/67)  RR: 16 (22 @ 05:46) (16 - 18)  SpO2: 94% (22 @ 05:46) (92% - 94%)  CAPILLARY BLOOD GLUCOSE    I&O's Detail    Physical exam:  General: A+O x 3 in NAD  HEENT: PERRLA, EOM intact  Neck: trachea midline  Chest: Expiratory wheezing bilaterally,  Heart: S1,S1 RRR, no murmurs noted  Abdomen: soft non distended, non tender,  BS x 4, no guarding noted  Extremities: no edema noted, warm,  no calf tenderness     MEDICATIONS  (STANDING):  heparin   Injectable 5000 Unit(s) SubCutaneous every 12 hours  influenza   Vaccine 0.5 milliLiter(s) IntraMuscular once  losartan 50 milliGRAM(s) Oral daily  pantoprazole    Tablet 40 milliGRAM(s) Oral before breakfast  sodium chloride 0.9%. 1000 milliLiter(s) (75 mL/Hr) IV Continuous <Continuous>    MEDICATIONS  (PRN):  acetaminophen     Tablet .. 650 milliGRAM(s) Oral every 6 hours PRN Mild Pain (1 - 3)  albuterol/ipratropium for Nebulization 3 milliLiter(s) Nebulizer every 6 hours PRN Bronchospasm  benzonatate 100 milliGRAM(s) Oral every 8 hours PRN Cough  guaiFENesin Oral Liquid (Sugar-Free) 100 milliGRAM(s) Oral every 6 hours PRN Cough  ibuprofen  Tablet. 400 milliGRAM(s) Oral every 8 hours PRN Mild Pain (1 - 3)  melatonin 5 milliGRAM(s) Oral at bedtime PRN Insomnia      LABS:                        11.6   10.98 )-----------( x        ( 28 Dec 2022 07:00 )             35.3         139  |  106  |  26<H>  ----------------------------<  132<H>  4.8   |  27  |  0.89    Ca    8.3<L>      27 Dec 2022 07:33    TPro  7.1  /  Alb  2.7<L>  /  TBili  0.3  /  DBili  x   /  AST  37  /  ALT  112<H>  /  AlkPhos  150<H>      PT/INR - ( 26 Dec 2022 11:34 )   PT: 16.8 sec;   INR: 1.42 ratio         PTT - ( 26 Dec 2022 11:34 )  PTT:41.6 sec  Urinalysis Basic - ( 26 Dec 2022 13:20 )    Color: Yellow / Appearance: Clear / S.020 / pH: x  Gluc: x / Ketone: Moderate  / Bili: Negative / Urobili: Negative mg/dL   Blood: x / Protein: 30 mg/dL / Nitrite: Negative   Leuk Esterase: Trace / RBC: Negative /HPF / WBC 0-2   Sq Epi: x / Non Sq Epi: Occasional / Bacteria: Few    RADIOLOGY & ADDITIONAL STUDIES:    Assessment/Plan  Patient is a 62y old male who presents with a chief complaint of fever, diarrhea     Continue current care  Nebulizer for wheeze  Diet -  GI/DVT prophylaxis  Analgesia prn  OOB/ambulation on the floor   Incentive spirometry/Cough/Deep breathing exercises  Sequentials  Consults       SURGERY Progress Note PA    62 yr old with PMH of HTN, h/o appendectomy presenting with fevers, body aches, diarrhea, has 6 days of fever (T-max 102) headache body aches coughing status post trip to [place].    SUBJECTIVE:   Patient seen at bedside, no overnight events, complains of wheezing.  Pain is controlled at this time.   Patient admits to flatus, bowel movement.   Patient denies any headaches, chest pain, shortness of breath, nausea, vomiting, fever, chills, weakness, dysuria  Patient A+Ox3 in NAD at time of visit.    OBJECTIVE:   T(C): 36.9 (22 @ 05:46), Max: 36.9 (22 @ 05:46)  HR: 81 (22 @ 05:46) (69 - 81)  BP: 102/64 (22 @ 05:46) (100/57 - 114/67)  RR: 16 (22 @ 05:46) (16 - 18)  SpO2: 94% (22 @ 05:46) (92% - 94%)  CAPILLARY BLOOD GLUCOSE    I&O's Detail    Physical exam:  General: A+O x 3 in NAD  HEENT: PERRLA, EOM intact  Neck: trachea midline  Chest: Expiratory wheezing bilaterally,  Heart: S1,S1 RRR, no murmurs noted  Abdomen: soft non distended, non tender,  BS x 4, no guarding noted  Extremities: no edema noted, warm,  no calf tenderness     MEDICATIONS  (STANDING):  heparin   Injectable 5000 Unit(s) SubCutaneous every 12 hours  influenza   Vaccine 0.5 milliLiter(s) IntraMuscular once  losartan 50 milliGRAM(s) Oral daily  pantoprazole    Tablet 40 milliGRAM(s) Oral before breakfast  sodium chloride 0.9%. 1000 milliLiter(s) (75 mL/Hr) IV Continuous <Continuous>    MEDICATIONS  (PRN):  acetaminophen     Tablet .. 650 milliGRAM(s) Oral every 6 hours PRN Mild Pain (1 - 3)  albuterol/ipratropium for Nebulization 3 milliLiter(s) Nebulizer every 6 hours PRN Bronchospasm  benzonatate 100 milliGRAM(s) Oral every 8 hours PRN Cough  guaiFENesin Oral Liquid (Sugar-Free) 100 milliGRAM(s) Oral every 6 hours PRN Cough  ibuprofen  Tablet. 400 milliGRAM(s) Oral every 8 hours PRN Mild Pain (1 - 3)  melatonin 5 milliGRAM(s) Oral at bedtime PRN Insomnia      LABS:                        11.6   10.98 )-----------( x        ( 28 Dec 2022 07:00 )             35.3         139  |  106  |  26<H>  ----------------------------<  132<H>  4.8   |  27  |  0.89    Ca    8.3<L>      27 Dec 2022 07:33    TPro  7.1  /  Alb  2.7<L>  /  TBili  0.3  /  DBili  x   /  AST  37  /  ALT  112<H>  /  AlkPhos  150<H>      PT/INR - ( 26 Dec 2022 11:34 )   PT: 16.8 sec;   INR: 1.42 ratio         PTT - ( 26 Dec 2022 11:34 )  PTT:41.6 sec  Urinalysis Basic - ( 26 Dec 2022 13:20 )    Color: Yellow / Appearance: Clear / S.020 / pH: x  Gluc: x / Ketone: Moderate  / Bili: Negative / Urobili: Negative mg/dL   Blood: x / Protein: 30 mg/dL / Nitrite: Negative   Leuk Esterase: Trace / RBC: Negative /HPF / WBC 0-2   Sq Epi: x / Non Sq Epi: Occasional / Bacteria: Few    RADIOLOGY & ADDITIONAL STUDIES:    Assessment/Plan  Patient is a 62y old male who presents with a chief complaint of fever, diarrhea     Continue current care  Nebulizer for wheeze  Diet - DASH - tolerating  GI/DVT prophylaxis  Analgesia prn  OOB/ambulation on the floor   Incentive spirometry/Cough/Deep breathing exercises  Sequentials  Consults  Medicine to discuss lab results  Dr. Landaverde to round        SURGERY Progress Note PA    62 yr old with PMH of HTN, h/o appendectomy presenting with fevers, body aches, diarrhea, has 6 days of fever (T-max 102) headache body aches coughing status post trip to [place].    SUBJECTIVE:   Patient seen at bedside, no overnight events, complains of wheezing.  Pain is controlled at this time.   Patient admits to flatus, bowel movement.   Patient denies any headaches, chest pain, shortness of breath, nausea, vomiting, fever, chills, weakness, dysuria  Patient A+Ox3 in NAD at time of visit.    OBJECTIVE:   T(C): 36.9 (22 @ 05:46), Max: 36.9 (22 @ 05:46)  HR: 81 (22 @ 05:46) (69 - 81)  BP: 102/64 (22 @ 05:46) (100/57 - 114/67)  RR: 16 (22 @ 05:46) (16 - 18)  SpO2: 94% (22 @ 05:46) (92% - 94%)  CAPILLARY BLOOD GLUCOSE    I&O's Detail    Physical exam:  General: A+O x 3 in NAD  HEENT: PERRLA, EOM intact  Neck: trachea midline  Chest: Expiratory wheezing bilaterally,  Heart: S1,S1 RRR, no murmurs noted  Abdomen: soft non distended, non tender,  BS x 4, no guarding noted  Extremities: no edema noted, warm,  no calf tenderness     MEDICATIONS  (STANDING):  heparin   Injectable 5000 Unit(s) SubCutaneous every 12 hours  influenza   Vaccine 0.5 milliLiter(s) IntraMuscular once  losartan 50 milliGRAM(s) Oral daily  pantoprazole    Tablet 40 milliGRAM(s) Oral before breakfast  sodium chloride 0.9%. 1000 milliLiter(s) (75 mL/Hr) IV Continuous <Continuous>    MEDICATIONS  (PRN):  acetaminophen     Tablet .. 650 milliGRAM(s) Oral every 6 hours PRN Mild Pain (1 - 3)  albuterol/ipratropium for Nebulization 3 milliLiter(s) Nebulizer every 6 hours PRN Bronchospasm  benzonatate 100 milliGRAM(s) Oral every 8 hours PRN Cough  guaiFENesin Oral Liquid (Sugar-Free) 100 milliGRAM(s) Oral every 6 hours PRN Cough  ibuprofen  Tablet. 400 milliGRAM(s) Oral every 8 hours PRN Mild Pain (1 - 3)  melatonin 5 milliGRAM(s) Oral at bedtime PRN Insomnia      LABS:                        11.6   10.98 )-----------( x        ( 28 Dec 2022 07:00 )             35.3         139  |  106  |  26<H>  ----------------------------<  132<H>  4.8   |  27  |  0.89    Ca    8.3<L>      27 Dec 2022 07:33    TPro  7.1  /  Alb  2.7<L>  /  TBili  0.3  /  DBili  x   /  AST  37  /  ALT  112<H>  /  AlkPhos  150<H>      PT/INR - ( 26 Dec 2022 11:34 )   PT: 16.8 sec;   INR: 1.42 ratio         PTT - ( 26 Dec 2022 11:34 )  PTT:41.6 sec  Urinalysis Basic - ( 26 Dec 2022 13:20 )    Color: Yellow / Appearance: Clear / S.020 / pH: x  Gluc: x / Ketone: Moderate  / Bili: Negative / Urobili: Negative mg/dL   Blood: x / Protein: 30 mg/dL / Nitrite: Negative   Leuk Esterase: Trace / RBC: Negative /HPF / WBC 0-2   Sq Epi: x / Non Sq Epi: Occasional / Bacteria: Few    RADIOLOGY & ADDITIONAL STUDIES:    Assessment/Plan  Patient is a 62y old male who presents with a chief complaint of fever, diarrhea found to be EBV+ ? infectious vs neoplastic process    ID and heme f/u  pts symptoms improving without abx  gallbladder wall edema likely 2/2 underlying process, no clinical concern for acute cholecystitis  no acute surgical intervention please call with questions/concerns

## 2022-12-28 NOTE — PROGRESS NOTE ADULT - ASSESSMENT
62 yr old with PMH of HTN ,h/o appendectomy presenting with fevers, body aches, diarrhea, hasf 6 days of fever (T-max 102) headache body aches coughing status post trip to [place].     small pleural eff  diffuse lymphadenopathy  atelectasis  pulm nodules - sub cm - in a non smoker -   HTN  OA  fever - aches - diarrhea - dyspepsia -     HEME - ID - GI eval noted -   VS noted  on IVF  labs reviewed    CT chest and abd noted  cvs rx regimen and BP control  biomarkers  IV hydration  serial ABD exam

## 2022-12-28 NOTE — PROGRESS NOTE ADULT - SUBJECTIVE AND OBJECTIVE BOX
[facility]  Víctor Leon PA-C  [address] [place] [address]  [phone]      INTERVAL HPI/OVERNIGHT EVENTS:    patient s/e  no diarrhea  eating ok  has headache and cant sleep    MEDICATIONS  (STANDING):  heparin   Injectable 5000 Unit(s) SubCutaneous every 12 hours  influenza   Vaccine 0.5 milliLiter(s) IntraMuscular once  losartan 50 milliGRAM(s) Oral daily  pantoprazole    Tablet 40 milliGRAM(s) Oral before breakfast  sodium chloride 0.9%. 1000 milliLiter(s) (75 mL/Hr) IV Continuous <Continuous>    MEDICATIONS  (PRN):  acetaminophen     Tablet .. 650 milliGRAM(s) Oral every 6 hours PRN Mild Pain (1 - 3)  albuterol/ipratropium for Nebulization 3 milliLiter(s) Nebulizer every 6 hours PRN Bronchospasm  benzonatate 100 milliGRAM(s) Oral every 8 hours PRN Cough  guaiFENesin Oral Liquid (Sugar-Free) 100 milliGRAM(s) Oral every 6 hours PRN Cough  ibuprofen  Tablet. 400 milliGRAM(s) Oral every 8 hours PRN Mild Pain (1 - 3)  melatonin 5 milliGRAM(s) Oral at bedtime PRN Insomnia      Allergies    Vicodin (Hives)    Intolerances        ROS:   General:  No wt loss, fevers, chills, night sweats, fatigue,   Eyes:  Good vision, no reported pain  ENT:  No sore throat, pain, runny nose, dysphagia  CV:  No pain, palpitations, hypo/hypertension  Resp:  No dyspnea, cough, tachypnea, wheezing  GI:  No pain, No nausea, No vomiting, No diarrhea, No constipation, No weight loss, No fever, No pruritis, No rectal bleeding, No tarry stools, No dysphagia,  :  No pain, bleeding, incontinence, nocturia  Muscle:  No pain, weakness  Neuro:  No weakness, tingling, memory problems  Psych:  No fatigue, insomnia, mood problems, depression  Endocrine:  No polyuria, polydipsia, cold/heat intolerance  Heme:  No petechiae, ecchymosis, easy bruisability  Skin:  No rash, tattoos, scars, edema      PHYSICAL EXAM:   Vital Signs:  Vital Signs Last 24 Hrs  T(C): 36.9 (28 Dec 2022 05:46), Max: 36.9 (28 Dec 2022 05:46)  T(F): 98.4 (28 Dec 2022 05:46), Max: 98.4 (28 Dec 2022 05:46)  HR: 81 (28 Dec 2022 05:46) (69 - 81)  BP: 102/64 (28 Dec 2022 05:46) (100/57 - 114/67)  BP(mean): --  RR: 16 (28 Dec 2022 05:46) (16 - 18)  SpO2: 94% (28 Dec 2022 05:46) (92% - 94%)    Parameters below as of 28 Dec 2022 05:46  Patient On (Oxygen Delivery Method): room air      Daily     Daily     GENERAL:  Appears stated age,   HEENT:  NC/AT,    CHEST:  Full & symmetric excursion,   HEART:  Regular rhythm,  ABDOMEN:  Soft, non-tender, non-distended,  EXTEREMITIES:  no cyanosis  SKIN:  No rash  NEURO:  Alert,       LABS:                        11.6   10.98 )-----------( 236      ( 28 Dec 2022 07:00 )             35.3     12-    139  |  106  |  26<H>  ----------------------------<  132<H>  4.8   |  27  |  0.89    Ca    8.3<L>      27 Dec 2022 07:33    TPro  7.1  /  Alb  2.7<L>  /  TBili  0.3  /  DBili  x   /  AST  37  /  ALT  112<H>  /  AlkPhos  150<H>  12    PT/INR - ( 26 Dec 2022 11:34 )   PT: 16.8 sec;   INR: 1.42 ratio         PTT - ( 26 Dec 2022 11:34 )  PTT:41.6 sec  Urinalysis Basic - ( 26 Dec 2022 13:20 )    Color: Yellow / Appearance: Clear / S.020 / pH: x  Gluc: x / Ketone: Moderate  / Bili: Negative / Urobili: Negative mg/dL   Blood: x / Protein: 30 mg/dL / Nitrite: Negative   Leuk Esterase: Trace / RBC: Negative /HPF / WBC 0-2   Sq Epi: x / Non Sq Epi: Occasional / Bacteria: Few        RADIOLOGY & ADDITIONAL TESTS:

## 2022-12-28 NOTE — PROGRESS NOTE ADULT - ASSESSMENT
abnormal CT  cough  fever    cont reg diet  check GI pcr if diarrhea develops, but suspect diarrhea was related to doxy which he stopped  heme eval noted  agree with excisional LN biopsy  this is not hepatitis   observe off antibiotics  ID eval noted  will need egd/colon once cough resolves; likely as outpatient when wheezing resolves  d/w patient  at bedside    I reviewed the overnight course of events on the unit, re-confirming the patient history. I discussed the care with the patient and their family  The plan of care was discussed with the physician assistant and modifications were made to the notation where appropriate.   Differential diagnosis and plan of care discussed with patient after the evaluation  35 minutes spent on total encounter of which more than fifty percent of the encounter was spent counseling and/or coordinating care by the attending physician.  Advanced care planning was discussed with patient and family.  Advanced care planning forms were reviewed and discussed.  Risks, benefits and alternatives of gastroenterologic procedures were discussed in detail and all questions were answered.

## 2022-12-28 NOTE — PROGRESS NOTE ADULT - ASSESSMENT
62 yr old with PMH of HTN ,h/o appendectomy presenting with fevers, body aches, diarrhea, hasf 6 days of fever (T-max 102) headache body aches coughing status post trip to [place].  Patient relates went to urgent care 3 days ago had a negative flu and COVID rapid test but was given Tamiflu anyway in case he still had the flu despite the negative test.  Patient developed nausea and some vomiting after starting Tamiflu he called his doctor yesterday they started him on doxycycline in case he had a bacterial infection.  No sore throat runny nose abdominal pain chest pain short of breath urinary complaints rash neck stiffness or photophobia. Last Tylenol approximately 1.5 hours prior to arrival  .PMD Attard   In ED febrile, with tachycadia, tachypnea, no leucocytosis, has lymphopenia,elevated LFT( as per patient, FT are chronically high, no cause found), had US abd -diffuse GB edema and periportal adenopathy,seen by surg- has elevated bun creat , had CT abd chest-< from: CT Chest w/ IV Cont (12.26.22 @ 17:01) >  Diffuse thoracic and abdominopelvic lymphadenopathy. Mild splenomegaly.   Consideration is given to a infectious/inflammatory process versus a   neoplastic process such as lymphoproliferative disease.  Mild hepatomegaly. Periportal edema, a nonspecific finding that may be   seen with hepatitis or aggressive fluid resuscitation. Correlate with   liver function tests.  Underdistended gallbladder. Edematous gallbladder wall thickening, also   nonspecific and may be related to underlying liver pathology.  Gastric wall thickening versus underdistention, possibly representing   gastritis. Consider correlation with upper endoscopy to assess for   underlying neoplasm.  A few small nonspecific pulmonary nodules. Continued follow-up is   recommended.  Small right and trace left pleural effusions.    Small volume ascites.    Being admitted for further management     ID GI , hemeonc and pulmonary opinion sought   62 yr old with PMH of HTN ,h/o appendectomy presenting with fevers, body aches, diarrhea, hasf 6 days of fever (T-max 102) headache body aches coughing status post trip to [place].  Patient relates went to urgent care 3 days ago had a negative flu and COVID rapid test but was given Tamiflu anyway in case he still had the flu despite the negative test.  Patient developed nausea and some vomiting after starting Tamiflu he called his doctor yesterday they started him on doxycycline in case he had a bacterial infection.  No sore throat runny nose abdominal pain chest pain short of breath urinary complaints rash neck stiffness or photophobia. Last Tylenol approximately 1.5 hours prior to arrival  .PMD Attard   In ED febrile, with tachycadia, tachypnea, no leucocytosis, has lymphopenia,elevated LFT( as per patient, FT are chronically high, no cause found), had US abd -diffuse GB edema and periportal adenopathy,seen by surg- has elevated bun creat , had CT abd chest-< from: CT Chest w/ IV Cont (12.26.22 @ 17:01) >  Diffuse thoracic and abdominopelvic lymphadenopathy. Mild splenomegaly.   Consideration is given to a infectious/inflammatory process versus a   neoplastic process such as lymphoproliferative disease.  Mild hepatomegaly. Periportal edema, a nonspecific finding that may be   seen with hepatitis or aggressive fluid resuscitation. Correlate with   liver function tests.  Underdistended gallbladder. Edematous gallbladder wall thickening, also   nonspecific and may be related to underlying liver pathology.  Gastric wall thickening versus underdistention, possibly representing   gastritis. Consider correlation with upper endoscopy to assess for   underlying neoplasm.  A few small nonspecific pulmonary nodules. Continued follow-up is   recommended.  Small right and trace left pleural effusions.    Small volume ascites.    Being admitted for further management     ID GI , hemeonc and pulmonary opinion sought    has EBV positive, suggested LN biopsy, d/w DR Jose Angel KING

## 2022-12-28 NOTE — PROGRESS NOTE ADULT - ASSESSMENT
Patient is a 62 year old male with PMH of HTN, h/o appendectomy who presented with complaint of fever, chills, headache. Patient felt unwell with headache prior to going to [place]. In [place], after going to a pub and having tilapia tacos - he developed a diffuse body rash overnight which resolved with benadryl. He has not had any fish allergies before but this was a new fish. He then started to have fevers the following night with chills and continued headache, Tm 102F. He went to  Friday and tested negative for flu and COVID but was febrile so started on tamiflu but felt worse after. His covering PMD started him on doxycycline and reports having watery diarrhea and abdominal distention; diarrhea has since resolved but still feels abdomen is distended. No nausea, vomiting, abdominal pain.     Fever with lymphopenia, eosinophilia, transaminitis, [place]   with CT imaging showing diffuse lymphadenopathy, mild hepatosplenomegaly, periportal edema, gall bladder wall thickening, few small nonspecific pulm nodules and small -trace pleural effusions, small ascites  --unclear etiology ?viral, concern for malignancy    Rash -- had twice -- resolved with antihistamine each time; no further rash  - afebrile since 12/26 pm, not on any abx  - lymphopenia, eosinophilia resolved, LFTs downtrending  - mildly elevated WBC with no neutrophilia-stable   - COVID/RSV/Flu negative   - UA negative for pyuria   - blood cultures NGTD x2  - hepatitis panel negative   - CMV serology negative  - EBV serology c/w either reactivation vs recent infection   Recommendations:   -- Heme/Onc following - plan for LN biopsy pending axilla US  -- GI, Surgery following  -- consider Neurology evaluation for headache  -- continue to monitor off antibiotics  -- monitor temps/CBC, CMP      Taqueria Goodman M.D.  [facility], Division of Infectious Diseases  [phone]  After 5pm on weekdays and all day on weekends - please call [phone] Patient is a 62 year old male with PMH of HTN, h/o appendectomy who presented with complaint of fever, chills, headache. Patient felt unwell with headache prior to going to [place]. In [place], after going to a pub and having tilapia tacos - he developed a diffuse body rash overnight which resolved with benadryl. He has not had any fish allergies before but this was a new fish. He then started to have fevers the following night with chills and continued headache, Tm 102F. He went to  Friday and tested negative for flu and COVID but was febrile so started on tamiflu but felt worse after. His covering PMD started him on doxycycline and reports having watery diarrhea and abdominal distention; diarrhea has since resolved but still feels abdomen is distended. No nausea, vomiting, abdominal pain.     Fever with lymphopenia, eosinophilia, transaminitis, [place]   with CT imaging showing diffuse lymphadenopathy, mild hepatosplenomegaly, periportal edema, gall bladder wall thickening, few small nonspecific pulm nodules and small -trace pleural effusions, small ascites  --unclear etiology ?viral ?recent or reactivated EBV vs neoplastic     Rash -- had twice -- resolved with antihistamine each time; no further rash  - afebrile since 12/26 pm, not on any abx  - lymphopenia, eosinophilia resolved, LFTs downtrending  - mildly elevated WBC with no neutrophilia-stable   - COVID/RSV/Flu negative   - UA negative for pyuria   - blood cultures NGTD x2  - hepatitis panel negative   - CMV serology negative  - EBV serology c/w either reactivation vs recent infection   Recommendations:   -- Heme/Onc following - plan for LN biopsy pending axilla US  -- GI, Surgery following  -- consider Neurology evaluation for headache  -- continue to monitor off antibiotics  -- monitor temps/CBC, CMP      Taqueria Goodman M.D.  [facility], Division of Infectious Diseases  [phone]  After 5pm on weekdays and all day on weekends - please call [phone]

## 2022-12-28 NOTE — PROGRESS NOTE ADULT - SUBJECTIVE AND OBJECTIVE BOX
----- Message from Shiva Solorio DO sent at 9/18/2017 12:05 PM CDT -----  Please notify patient's caregivers that his hemoglobin A1c has increased 8.7%. I would recommend increasing the glimepiride medication to 4 mg in the morning and 2 mg in the evening.     [INTERVAL HX: ]  Patient seen and examined;  Chart reviewed and events noted;     no CP, no SOB    +HA for last 2wks  states did fele better post some food.   but HA persist  not sleeping well.     spoke with wife Jennifer on tele extnsively    [MEDICATIONS]  MEDICATIONS  (STANDING):  ALPRAZolam 0.25 milliGRAM(s) Oral once  heparin   Injectable 5000 Unit(s) SubCutaneous every 12 hours  influenza   Vaccine 0.5 milliLiter(s) IntraMuscular once  losartan 50 milliGRAM(s) Oral daily  pantoprazole    Tablet 40 milliGRAM(s) Oral before breakfast  sodium chloride 0.9%. 1000 milliLiter(s) (75 mL/Hr) IV Continuous <Continuous>    MEDICATIONS  (PRN):  acetaminophen     Tablet .. 650 milliGRAM(s) Oral every 6 hours PRN Mild Pain (1 - 3)  albuterol/ipratropium for Nebulization 3 milliLiter(s) Nebulizer every 6 hours PRN Bronchospasm  benzonatate 100 milliGRAM(s) Oral every 8 hours PRN Cough  guaiFENesin Oral Liquid (Sugar-Free) 100 milliGRAM(s) Oral every 6 hours PRN Cough  ibuprofen  Tablet. 400 milliGRAM(s) Oral every 8 hours PRN Mild Pain (1 - 3)  melatonin 5 milliGRAM(s) Oral at bedtime PRN Insomnia      [VITALS]  Vital Signs Last 24 Hrs  T(C): 36.4 (28 Dec 2022 11:18), Max: 36.9 (28 Dec 2022 05:46)  T(F): 97.6 (28 Dec 2022 11:18), Max: 98.4 (28 Dec 2022 05:46)  HR: 81 (28 Dec 2022 11:18) (69 - 81)  BP: 122/64 (28 Dec 2022 11:18) (100/57 - 122/64)  BP(mean): --  RR: 17 (28 Dec 2022 11:18) (16 - 18)  SpO2: 96% (28 Dec 2022 11:18) (92% - 96%)    Parameters below as of 28 Dec 2022 05:46  Patient On (Oxygen Delivery Method): room air      [WT/HT]  Daily     Daily   [VENT]      [PHYSICAL EXAM]  WN WD NAD  anitcteirc  S1S2 RRR  CTAB  soft NABS NT ND   no HSM  no LE edmea  No SC SOLOMON, no axilla LN palp  AOx4    [LABS:]                        11.6   10.98 )-----------( 236      ( 28 Dec 2022 07:00 )             35.3         140  |  107  |  18  ----------------------------<  88  4.7   |  26  |  0.98    Ca    8.0<L>      28 Dec 2022 07:00    TPro  7.2  /  Alb  2.9<L>  /  TBili  0.4  /  DBili  x   /  AST  52<H>  /  ALT  99<H>  /  AlkPhos  132<H>      PT/INR - ( 26 Dec 2022 11:34 )   PT: 16.8 sec;   INR: 1.42 ratio         PTT - ( 26 Dec 2022 11:34 )  PTT:41.6 sec      Sedimentation Rate, Erythrocyte: 74 mm/Hr *H* [0 - 9] (22 @ 07:33)    Urinalysis Basic - ( 26 Dec 2022 13:20 )    Color: Yellow / Appearance: Clear / S.020 / pH: x  Gluc: x / Ketone: Moderate  / Bili: Negative / Urobili: Negative mg/dL   Blood: x / Protein: 30 mg/dL / Nitrite: Negative   Leuk Esterase: Trace / RBC: Negative /HPF / WBC 0-2   Sq Epi: x / Non Sq Epi: Occasional / Bacteria: Few        Culture - Blood (collected 26 Dec 2022 11:34)  Source: .Blood Blood-Peripheral  Preliminary Report (27 Dec 2022 17:01):    No growth to date.    Culture - Blood (collected 26 Dec 2022 11:34)  Source: .Blood Blood-Peripheral  Preliminary Report (27 Dec 2022 17:01):    No growth to date.            Culture - Blood (collected 26 Dec 2022 11:34)  Source: .Blood Blood-Peripheral  Preliminary Report (27 Dec 2022 17:01):    No growth to date.    Culture - Blood (collected 26 Dec 2022 11:34)  Source: .Blood Blood-Peripheral  Preliminary Report (27 Dec 2022 17:01):    No growth to date.        [RADIOLOGY STUDIES:]

## 2022-12-28 NOTE — PROGRESS NOTE ADULT - PROBLEM SELECTOR PLAN 1
w up ordered, has hepatosplenomegaly with lymphadenopathy with ascies, empirical abx, and hemeonc consult, has EBV, elevated LFT. ID f up note

## 2022-12-29 VITALS
SYSTOLIC BLOOD PRESSURE: 136 MMHG | TEMPERATURE: 98 F | DIASTOLIC BLOOD PRESSURE: 75 MMHG | OXYGEN SATURATION: 95 % | HEART RATE: 77 BPM | RESPIRATION RATE: 16 BRPM

## 2022-12-29 LAB
HBV CORE AB SER-ACNC: SIGNIFICANT CHANGE UP
HBV CORE AB SER-ACNC: SIGNIFICANT CHANGE UP
HBV SURFACE AB SER-ACNC: <3 MIU/ML — LOW
HBV SURFACE AB SER-ACNC: SIGNIFICANT CHANGE UP
HCV AB S/CO SERPL IA: 0.07 S/CO — SIGNIFICANT CHANGE UP (ref 0–0.99)
HCV AB SERPL-IMP: SIGNIFICANT CHANGE UP
IGA FLD-MCNC: 153 MG/DL — SIGNIFICANT CHANGE UP (ref 84–499)
IGG FLD-MCNC: 1465 MG/DL — SIGNIFICANT CHANGE UP (ref 610–1660)
IGM SERPL-MCNC: 72 MG/DL — SIGNIFICANT CHANGE UP (ref 35–242)
KAPPA LC SER QL IFE: 3.98 MG/DL — HIGH (ref 0.33–1.94)
KAPPA LC SER QL IFE: 3.98 MG/DL — HIGH (ref 0.33–1.94)
KAPPA/LAMBDA FREE LIGHT CHAIN RATIO, SERUM: 1.01 RATIO — SIGNIFICANT CHANGE UP (ref 0.26–1.65)
KAPPA/LAMBDA FREE LIGHT CHAIN RATIO, SERUM: 1.01 RATIO — SIGNIFICANT CHANGE UP (ref 0.26–1.65)
LAMBDA LC SER QL IFE: 3.93 MG/DL — HIGH (ref 0.57–2.63)
LAMBDA LC SER QL IFE: 3.93 MG/DL — HIGH (ref 0.57–2.63)

## 2022-12-29 PROCEDURE — 83605 ASSAY OF LACTIC ACID: CPT

## 2022-12-29 PROCEDURE — 80053 COMPREHEN METABOLIC PANEL: CPT

## 2022-12-29 PROCEDURE — 83615 LACTATE (LD) (LDH) ENZYME: CPT

## 2022-12-29 PROCEDURE — 84480 ASSAY TRIIODOTHYRONINE (T3): CPT

## 2022-12-29 PROCEDURE — 84443 ASSAY THYROID STIM HORMONE: CPT

## 2022-12-29 PROCEDURE — 71260 CT THORAX DX C+: CPT | Mod: MA

## 2022-12-29 PROCEDURE — 36415 COLL VENOUS BLD VENIPUNCTURE: CPT

## 2022-12-29 PROCEDURE — 96375 TX/PRO/DX INJ NEW DRUG ADDON: CPT

## 2022-12-29 PROCEDURE — 85025 COMPLETE CBC W/AUTO DIFF WBC: CPT

## 2022-12-29 PROCEDURE — 82784 ASSAY IGA/IGD/IGG/IGM EACH: CPT

## 2022-12-29 PROCEDURE — 84436 ASSAY OF TOTAL THYROXINE: CPT

## 2022-12-29 PROCEDURE — 81001 URINALYSIS AUTO W/SCOPE: CPT

## 2022-12-29 PROCEDURE — 74177 CT ABD & PELVIS W/CONTRAST: CPT | Mod: MA

## 2022-12-29 PROCEDURE — 82378 CARCINOEMBRYONIC ANTIGEN: CPT

## 2022-12-29 PROCEDURE — 86663 EPSTEIN-BARR ANTIBODY: CPT

## 2022-12-29 PROCEDURE — 86665 EPSTEIN-BARR CAPSID VCA: CPT

## 2022-12-29 PROCEDURE — 86334 IMMUNOFIX E-PHORESIS SERUM: CPT

## 2022-12-29 PROCEDURE — 86140 C-REACTIVE PROTEIN: CPT

## 2022-12-29 PROCEDURE — 86664 EPSTEIN-BARR NUCLEAR ANTIGEN: CPT

## 2022-12-29 PROCEDURE — 83521 IG LIGHT CHAINS FREE EACH: CPT

## 2022-12-29 PROCEDURE — 86644 CMV ANTIBODY: CPT

## 2022-12-29 PROCEDURE — 87040 BLOOD CULTURE FOR BACTERIA: CPT

## 2022-12-29 PROCEDURE — 86803 HEPATITIS C AB TEST: CPT

## 2022-12-29 PROCEDURE — 96374 THER/PROPH/DIAG INJ IV PUSH: CPT

## 2022-12-29 PROCEDURE — 76882 US LMTD JT/FCL EVL NVASC XTR: CPT

## 2022-12-29 PROCEDURE — 80074 ACUTE HEPATITIS PANEL: CPT

## 2022-12-29 PROCEDURE — 85730 THROMBOPLASTIN TIME PARTIAL: CPT

## 2022-12-29 PROCEDURE — 85652 RBC SED RATE AUTOMATED: CPT

## 2022-12-29 PROCEDURE — 85610 PROTHROMBIN TIME: CPT

## 2022-12-29 PROCEDURE — 99285 EMERGENCY DEPT VISIT HI MDM: CPT

## 2022-12-29 PROCEDURE — 86704 HEP B CORE ANTIBODY TOTAL: CPT

## 2022-12-29 PROCEDURE — 87086 URINE CULTURE/COLONY COUNT: CPT

## 2022-12-29 PROCEDURE — 94640 AIRWAY INHALATION TREATMENT: CPT

## 2022-12-29 PROCEDURE — 86706 HEP B SURFACE ANTIBODY: CPT

## 2022-12-29 PROCEDURE — 93005 ELECTROCARDIOGRAM TRACING: CPT

## 2022-12-29 PROCEDURE — 71045 X-RAY EXAM CHEST 1 VIEW: CPT

## 2022-12-29 PROCEDURE — 86645 CMV ANTIBODY IGM: CPT

## 2022-12-29 PROCEDURE — 87637 SARSCOV2&INF A&B&RSV AMP PRB: CPT

## 2022-12-29 PROCEDURE — 76700 US EXAM ABDOM COMPLETE: CPT

## 2022-12-29 RX ADMIN — Medication 400 MILLIGRAM(S): at 06:51

## 2022-12-29 RX ADMIN — SODIUM CHLORIDE 75 MILLILITER(S): 9 INJECTION INTRAMUSCULAR; INTRAVENOUS; SUBCUTANEOUS at 05:52

## 2022-12-29 RX ADMIN — Medication 5 MILLIGRAM(S): at 00:00

## 2022-12-29 RX ADMIN — Medication 650 MILLIGRAM(S): at 06:51

## 2022-12-29 RX ADMIN — LOSARTAN POTASSIUM 50 MILLIGRAM(S): 100 TABLET, FILM COATED ORAL at 05:52

## 2022-12-29 RX ADMIN — Medication 650 MILLIGRAM(S): at 05:51

## 2022-12-29 RX ADMIN — PANTOPRAZOLE SODIUM 40 MILLIGRAM(S): 20 TABLET, DELAYED RELEASE ORAL at 05:51

## 2022-12-29 RX ADMIN — Medication 100 MILLIGRAM(S): at 05:50

## 2022-12-29 RX ADMIN — Medication 400 MILLIGRAM(S): at 05:51

## 2022-12-29 RX ADMIN — Medication 100 MILLIGRAM(S): at 05:52

## 2022-12-29 NOTE — PROGRESS NOTE ADULT - SUBJECTIVE AND OBJECTIVE BOX
[facility], Division of Infectious Diseases  ANH Sandoval Y. Patel, S. Shah, G. [facility]  [phone]    Name: RIANA PETERSON  Age: 62y  Gender: Male  MRN: [id number]    Interval History:  Patient seen and examined at bedside this morning  No acute overnight events. Afebrile  Feeling weak/tired but better than before  Wife at bedside  Story reviewed as in HPI  Notes reviewed    Antibiotics:      Medications:  acetaminophen     Tablet .. 650 milliGRAM(s) Oral every 6 hours PRN  albuterol/ipratropium for Nebulization 3 milliLiter(s) Nebulizer every 6 hours PRN  benzonatate 100 milliGRAM(s) Oral every 8 hours PRN  guaiFENesin Oral Liquid (Sugar-Free) 100 milliGRAM(s) Oral every 6 hours PRN  heparin   Injectable 5000 Unit(s) SubCutaneous every 12 hours  ibuprofen  Tablet. 400 milliGRAM(s) Oral every 8 hours PRN  influenza   Vaccine 0.5 milliLiter(s) IntraMuscular once  losartan 50 milliGRAM(s) Oral daily  melatonin 5 milliGRAM(s) Oral at bedtime PRN  pantoprazole    Tablet 40 milliGRAM(s) Oral before breakfast  sodium chloride 0.9%. 1000 milliLiter(s) IV Continuous <Continuous>      Review of Systems:  A 10-point review of systems was obtained.     Review of systems otherwise negative except as previously noted.    Allergies: Vicodin (Hives)    For details regarding the patient's past medical history, social history, family history, and other miscellaneous elements, please refer the initial infectious diseases consultation and/or the admitting history and physical examination for this admission.    Objective:  Vitals:   T(C): 36.8 (12-29-22 @ 10:45), Max: 37.3 (12-29-22 @ 06:26)  HR: 77 (12-29-22 @ 10:45) (77 - 92)  BP: 136/75 (12-29-22 @ 10:45) (118/66 - 140/88)  RR: 16 (12-29-22 @ 10:45) (16 - 18)  SpO2: 95% (12-29-22 @ 10:45) (95% - 97%)    Physical Examination:  General: no acute distress  HEENT: NC/AT, EOMI, anicteric, no oral lesions  Neck: supple, no palpable LAD  Cardio: S1, S2 heard, RRR, no murmurs  Resp: breath sounds heard bilaterally, no rales, wheezes or rhonchi  Abd: soft, NT, ND  Ext: no edema or cyanosis  Skin: warm, dry, no visible rash      Laboratory Studies:  CBC:                       11.6   10.98 )-----------( 236      ( 28 Dec 2022 07:00 )             35.3     CMP: 12-28    140  |  107  |  18  ----------------------------<  88  4.7   |  26  |  0.98    Ca    8.0<L>      28 Dec 2022 07:00    TPro  7.2  /  Alb  2.9<L>  /  TBili  0.4  /  DBili  x   /  AST  52<H>  /  ALT  99<H>  /  AlkPhos  132<H>  12-28    LIVER FUNCTIONS - ( 28 Dec 2022 07:00 )  Alb: 2.9 g/dL / Pro: 7.2 g/dL / ALK PHOS: 132 U/L / ALT: 99 U/L DA / AST: 52 U/L / GGT: x               Microbiology: reviewed    Culture - Urine (collected 12-26-22 @ 13:20)  Source: Clean Catch Clean Catch (Midstream)  Final Report (12-28-22 @ 22:57):    No growth    Culture - Blood (collected 12-26-22 @ 11:34)  Source: .Blood Blood-Peripheral  Preliminary Report (12-27-22 @ 17:01):    No growth to date.    Culture - Blood (collected 12-26-22 @ 11:34)  Source: .Blood Blood-Peripheral  Preliminary Report (12-27-22 @ 17:01):    No growth to date.          Radiology: reviewed    < from: US Axilla Only, Bilateral (12.28.22 @ 13:53) >    ACC: [id number] EXAM:  US AXILLA ONLY BILAT                          PROCEDURE DATE:  12/28/2022          INTERPRETATION:  History: Enlarged axillary lymph nodes on the CT chest.   For possible biopsy.    Images from real-time ultrasound of the axilla are reviewed.    Multiple right axillary lymph nodes are seen. The largest measures 1.7 x   1.3 x 2.0 cm. Lymph nodes mostly appear morphologically normal with fatty   adelia. There is a rounded lymph node that measures 9 mm without a fatty   center.    Left axillary lymph nodes are also seen measuring up to 2.4 x 1.5 x 2.3   cm most are morphologically normal however there is one rounded lymph   node without a fatty hilum measuring up to 1.5 cm.    IMPRESSION: Multiple bilateral enlarged lymph nodes most of which are   morphologically normal. There are however some that are rounded without   fatty centers. In the setting of the diffuse thoracic and abdominal   adenopathy seen on the CT, findings are again suspicious for neoplastic   process such as lymphoma.    --- End of Report ---            ABHI MAGALLON MD; Attending Radiologist  This document has been electronically signed. Dec 28 2022  2:41PM    < end of copied text >

## 2022-12-29 NOTE — DISCHARGE NOTE PROVIDER - HOSPITAL COURSE
62 yr old with PMH of HTN ,h/o appendectomy presenting with fevers, body aches, diarrhea, hasf 6 days of fever (T-max 102) headache body aches coughing status post trip to [place].  Patient relates went to urgent care 3 days ago had a negative flu and COVID rapid test but was given Tamiflu anyway in case he still had the flu despite the negative test.  Patient developed nausea and some vomiting after starting Tamiflu he called his doctor yesterday they started him on doxycycline in case he had a bacterial infection.  No sore throat runny nose abdominal pain chest pain short of breath urinary complaints rash neck stiffness or photophobia. Last Tylenol approximately 1.5 hours prior to arrival  .PMD Attard   In ED febrile, with tachycadia, tachypnea, no leucocytosis, has lymphopenia,elevated LFT( as per patient, FT are chronically high, no cause found), had US abd -diffuse GB edema and periportal adenopathy,seen by surg- has elevated bun creat , had CT abd chest-< from: CT Chest w/ IV Cont (12.26.22 @ 17:01) >  Diffuse thoracic and abdominopelvic lymphadenopathy. Mild splenomegaly.   Consideration is given to a infectious/inflammatory process versus a   neoplastic process such as lymphoproliferative disease.  Mild hepatomegaly. Periportal edema, a nonspecific finding that may be   seen with hepatitis or aggressive fluid resuscitation. Correlate with   liver function tests.  Underdistended gallbladder. Edematous gallbladder wall thickening, also   nonspecific and may be related to underlying liver pathology.  Gastric wall thickening versus underdistention, possibly representing   gastritis. Consider correlation with upper endoscopy to assess for   underlying neoplasm.  A few small nonspecific pulmonary nodules. Continued follow-up is   recommended.  Small right and trace left pleural effusions.  Small volume ascites.  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

## 2022-12-29 NOTE — PROGRESS NOTE ADULT - ASSESSMENT
1.  CLAYTON - prerenal azotemia  2.  Fever of unknown origin in association with diffuse adenopathy    Improved renal indices. Will recheck am labs. Hematology follow up.   Will follow electrolytes and renal function trend. D/w patients family at bedside.

## 2022-12-29 NOTE — PROGRESS NOTE ADULT - SUBJECTIVE AND OBJECTIVE BOX
Patient is a 62y old  Male who presents with a chief complaint of fever (29 Dec 2022 12:04)      INTERVAL HPI/OVERNIGHT EVENTS:overnight events noted    Home Medications:  atorvastatin 40 mg oral tablet: 1 tab(s) orally once a day (26 Dec 2022 19:55)  doxycycline hyclate 100 mg oral tablet: 1 tab(s) orally 2 times a day (26 Dec 2022 19:55)  losartan 50 mg oral tablet: 1 tab(s) orally once a day (26 Dec 2022 19:55)  Tamiflu:  (26 Dec 2022 19:55)      MEDICATIONS  (STANDING):  heparin   Injectable 5000 Unit(s) SubCutaneous every 12 hours  influenza   Vaccine 0.5 milliLiter(s) IntraMuscular once  losartan 50 milliGRAM(s) Oral daily  pantoprazole    Tablet 40 milliGRAM(s) Oral before breakfast  sodium chloride 0.9%. 1000 milliLiter(s) (75 mL/Hr) IV Continuous <Continuous>    MEDICATIONS  (PRN):  acetaminophen     Tablet .. 650 milliGRAM(s) Oral every 6 hours PRN Mild Pain (1 - 3)  albuterol/ipratropium for Nebulization 3 milliLiter(s) Nebulizer every 6 hours PRN Bronchospasm  benzonatate 100 milliGRAM(s) Oral every 8 hours PRN Cough  guaiFENesin Oral Liquid (Sugar-Free) 100 milliGRAM(s) Oral every 6 hours PRN Cough  ibuprofen  Tablet. 400 milliGRAM(s) Oral every 8 hours PRN Mild Pain (1 - 3)  melatonin 5 milliGRAM(s) Oral at bedtime PRN Insomnia      Allergies    Vicodin (Hives)    Intolerances        REVIEW OF SYSTEMS:  CONSTITUTIONAL: No fever, weight loss, or fatigue  EYES: No eye pain, visual disturbances, or discharge  ENMT:  No difficulty hearing, tinnitus, vertigo; No sinus or throat pain  NECK: No pain or stiffness  BREASTS: No pain, masses, or nipple discharge  RESPIRATORY: No cough, wheezing, chills or hemoptysis; No shortness of breath  CARDIOVASCULAR: No chest pain, palpitations, dizziness, or leg swelling  GASTROINTESTINAL: No abdominal or epigastric pain. No nausea, vomiting,   GENITOURINARY: No dysuria, frequency, hematuria, or incontinence  NEUROLOGICAL: No headaches, memory loss, loss of strength, numbness, or tremors  SKIN: No itching, burning, rashes, or lesions   LYMPH NODES: No enlarged glands  ENDOCRINE: No heat or cold intolerance; No hair loss  MUSCULOSKELETAL: No joint pain or swelling; No muscle, back, or extremity pain  PSYCHIATRIC: No depression, anxiety, mood swings, or difficulty sleeping  HEME/LYMPH: No easy bruising, or bleeding gums  ALLERGY AND IMMUNOLOGIC: No hives or eczema    Vital Signs Last 24 Hrs  T(C): 36.8 (29 Dec 2022 10:45), Max: 37.3 (29 Dec 2022 06:26)  T(F): 98.3 (29 Dec 2022 10:45), Max: 99.1 (29 Dec 2022 06:26)  HR: 77 (29 Dec 2022 10:45) (77 - 92)  BP: 136/75 (29 Dec 2022 10:45) (118/66 - 140/88)  BP(mean): --  RR: 16 (29 Dec 2022 10:45) (16 - 18)  SpO2: 95% (29 Dec 2022 10:45) (95% - 97%)    Parameters below as of 29 Dec 2022 10:45  Patient On (Oxygen Delivery Method): room air        PHYSICAL EXAM:  GENERAL: NAD, well-groomed, well-developed  HEAD:  Atraumatic, Normocephalic  EYES: EOMI, PERRLA, conjunctiva and sclera clear  ENMT: Moist mucous membranes,   NECK: Supple, No JVD, Normal thyroid  NERVOUS SYSTEM:  Alert & Oriented X3, Good concentration; Motor Strength 5/5 B/L upper and lower extremities; DTRs 2+ intact and symmetric  CHEST/LUNG: Clear to percussion bilaterally; No rales, rhonchi, wheezing, or rubs  HEART: Regular rate and rhythm; No murmurs, rubs, or gallops  ABDOMEN: Soft, Nontender, Nondistended; Bowel sounds present  EXTREMITIES:  2+ Peripheral Pulses, No clubbing, cyanosis, or edema  LYMPH: No lymphadenopathy noted  SKIN: No rashes or lesions    LABS:                        11.6   10.98 )-----------( 236      ( 28 Dec 2022 07:00 )             35.3     12-28    140  |  107  |  18  ----------------------------<  88  4.7   |  26  |  0.98    Ca    8.0<L>      28 Dec 2022 07:00    TPro  7.2  /  Alb  2.9<L>  /  TBili  0.4  /  DBili  x   /  AST  52<H>  /  ALT  99<H>  /  AlkPhos  132<H>  12-28        CAPILLARY BLOOD GLUCOSE            Culture - Urine (collected 12-26-22 @ 13:20)  Source: Clean Catch Clean Catch (Midstream)  Final Report (12-28-22 @ 22:57):    No growth    Culture - Blood (collected 12-26-22 @ 11:34)  Source: .Blood Blood-Peripheral  Preliminary Report (12-27-22 @ 17:01):    No growth to date.    Culture - Blood (collected 12-26-22 @ 11:34)  Source: .Blood Blood-Peripheral  Preliminary Report (12-27-22 @ 17:01):    No growth to date.        I&O's Summary      RADIOLOGY & ADDITIONAL TESTS:    Imaging Personally Reviewed:  [x ] YES  [ ] NO    Consultant(s) Notes Reviewed:  [ x] YES  [ ] NO    Care Discussed with Consultants/Other Providers [x ] YES  [ ] NO

## 2022-12-29 NOTE — PROGRESS NOTE ADULT - SUBJECTIVE AND OBJECTIVE BOX
Interval History:  feels better    Chart reviewed and events noted;   Overnight events:    MEDICATIONS  (STANDING):  heparin   Injectable 5000 Unit(s) SubCutaneous every 12 hours  influenza   Vaccine 0.5 milliLiter(s) IntraMuscular once  losartan 50 milliGRAM(s) Oral daily  pantoprazole    Tablet 40 milliGRAM(s) Oral before breakfast  sodium chloride 0.9%. 1000 milliLiter(s) (75 mL/Hr) IV Continuous <Continuous>    MEDICATIONS  (PRN):  acetaminophen     Tablet .. 650 milliGRAM(s) Oral every 6 hours PRN Mild Pain (1 - 3)  albuterol/ipratropium for Nebulization 3 milliLiter(s) Nebulizer every 6 hours PRN Bronchospasm  benzonatate 100 milliGRAM(s) Oral every 8 hours PRN Cough  guaiFENesin Oral Liquid (Sugar-Free) 100 milliGRAM(s) Oral every 6 hours PRN Cough  ibuprofen  Tablet. 400 milliGRAM(s) Oral every 8 hours PRN Mild Pain (1 - 3)  melatonin 5 milliGRAM(s) Oral at bedtime PRN Insomnia      Vital Signs Last 24 Hrs  T(C): 36.8 (29 Dec 2022 10:45), Max: 37.3 (29 Dec 2022 06:26)  T(F): 98.3 (29 Dec 2022 10:45), Max: 99.1 (29 Dec 2022 06:26)  HR: 77 (29 Dec 2022 10:45) (77 - 92)  BP: 136/75 (29 Dec 2022 10:45) (118/66 - 140/88)  BP(mean): --  RR: 16 (29 Dec 2022 10:45) (16 - 18)  SpO2: 95% (29 Dec 2022 10:45) (95% - 97%)    Parameters below as of 29 Dec 2022 10:45  Patient On (Oxygen Delivery Method): room air        PHYSICAL EXAM  General: adult in NAD  HEENT: clear oropharynx, anicteric sclera, pink conjunctivae  Neck: supple  CV: normal S1S2 with no murmur rubs or gallops  Lungs: clear to auscultation, no wheezes, no rhales  Abdomen: soft non-tender non-distended, no hepato/splenomegaly  Ext: no clubbing cyanosis or edema  Skin: no rashes and no petichiae  Neuro: alert and oriented X3 no focal deficits      LABS:  CBC Full  -  ( 28 Dec 2022 07:00 )  WBC Count : 10.98 K/uL  RBC Count : 4.12 M/uL  Hemoglobin : 11.6 g/dL  Hematocrit : 35.3 %  Platelet Count - Automated : 236 K/uL  Mean Cell Volume : 85.7 fl  Mean Cell Hemoglobin : 28.2 pg  Mean Cell Hemoglobin Concentration : 32.9 gm/dL  Auto Neutrophil # : 5.96 K/uL  Auto Lymphocyte # : 2.14 K/uL  Auto Monocyte # : 1.00 K/uL  Auto Eosinophil # : 1.73 K/uL  Auto Basophil # : 0.11 K/uL  Auto Neutrophil % : 54.2 %  Auto Lymphocyte % : 19.5 %  Auto Monocyte % : 9.1 %  Auto Eosinophil % : 15.8 %  Auto Basophil % : 1.0 %    12-28    140  |  107  |  18  ----------------------------<  88  4.7   |  26  |  0.98    Ca    8.0<L>      28 Dec 2022 07:00    TPro  7.2  /  Alb  2.9<L>  /  TBili  0.4  /  DBili  x   /  AST  52<H>  /  ALT  99<H>  /  AlkPhos  132<H>  12-28        fe studies      WBC trend  10.98 K/uL (12-28-22 @ 07:00)  10.95 K/uL (12-27-22 @ 07:33)      Hgb trend  11.6 g/dL (12-28-22 @ 07:00)  11.4 g/dL (12-27-22 @ 07:33)      plt trend  236 K/uL (12-28-22 @ 07:00)  167 K/uL (12-27-22 @ 07:33)        RADIOLOGY & ADDITIONAL STUDIES:    < from: US Axilla Only, Bilateral (12.28.22 @ 13:53) >  ACC: [id number] EXAM:  US AXILLA ONLY BILAT                          PROCEDURE DATE:  12/28/2022          INTERPRETATION:  History: Enlarged axillary lymph nodes on the CT chest.   For possible biopsy.    Images from real-time ultrasound of the axilla are reviewed.    Multiple right axillary lymph nodes are seen. The largest measures 1.7 x   1.3 x 2.0 cm. Lymph nodes mostly appear morphologically normal with fatty   adelia. There is a rounded lymph node that measures 9 mm without a fatty   center.    Left axillary lymph nodes are also seen measuring up to 2.4 x 1.5 x 2.3   cm most are morphologically normal however there is one rounded lymph   node without a fatty hilum measuring up to 1.5 cm.    IMPRESSION: Multiple bilateral enlarged lymph nodes most of which are   morphologically normal. There are however some that are rounded without   fatty centers. In the setting of the diffuse thoracic and abdominal   adenopathy seen on the CT, findings are again suspicious for neoplastic   process such as lymphoma.    --- End of Report ---            ABHI MAGALLON MD; Attending Radiologist  This document has been electronically signed. Dec 28 2022  2:41PM    < end of copied text >

## 2022-12-29 NOTE — DISCHARGE NOTE PROVIDER - NSDCMRMEDTOKEN_GEN_ALL_CORE_FT
atorvastatin 40 mg oral tablet: 1 tab(s) orally once a day  losartan 50 mg oral tablet: 1 tab(s) orally once a day

## 2022-12-29 NOTE — PROGRESS NOTE ADULT - SUBJECTIVE AND OBJECTIVE BOX
Date/Time Patient Seen:  		  Referring MD:   Data Reviewed	       Patient is a 62y old  Male who presents with a chief complaint of fever (28 Dec 2022 12:43)      Subjective/HPI     PAST MEDICAL & SURGICAL HISTORY:        Medication list         MEDICATIONS  (STANDING):  heparin   Injectable 5000 Unit(s) SubCutaneous every 12 hours  influenza   Vaccine 0.5 milliLiter(s) IntraMuscular once  losartan 50 milliGRAM(s) Oral daily  pantoprazole    Tablet 40 milliGRAM(s) Oral before breakfast  sodium chloride 0.9%. 1000 milliLiter(s) (75 mL/Hr) IV Continuous <Continuous>    MEDICATIONS  (PRN):  acetaminophen     Tablet .. 650 milliGRAM(s) Oral every 6 hours PRN Mild Pain (1 - 3)  albuterol/ipratropium for Nebulization 3 milliLiter(s) Nebulizer every 6 hours PRN Bronchospasm  benzonatate 100 milliGRAM(s) Oral every 8 hours PRN Cough  guaiFENesin Oral Liquid (Sugar-Free) 100 milliGRAM(s) Oral every 6 hours PRN Cough  ibuprofen  Tablet. 400 milliGRAM(s) Oral every 8 hours PRN Mild Pain (1 - 3)  melatonin 5 milliGRAM(s) Oral at bedtime PRN Insomnia         Vitals log        ICU Vital Signs Last 24 Hrs  T(C): 37.3 (29 Dec 2022 06:26), Max: 37.3 (29 Dec 2022 06:26)  T(F): 99.1 (29 Dec 2022 06:26), Max: 99.1 (29 Dec 2022 06:26)  HR: 84 (29 Dec 2022 06:26) (80 - 92)  BP: 140/88 (29 Dec 2022 06:26) (118/66 - 140/88)  BP(mean): --  ABP: --  ABP(mean): --  RR: 18 (29 Dec 2022 06:26) (16 - 18)  SpO2: 97% (29 Dec 2022 06:26) (95% - 97%)    O2 Parameters below as of 29 Dec 2022 06:26  Patient On (Oxygen Delivery Method): room air                 Input and Output:  I&O's Detail      Lab Data                        11.6   10.98 )-----------( 236      ( 28 Dec 2022 07:00 )             35.3     12-28    140  |  107  |  18  ----------------------------<  88  4.7   |  26  |  0.98    Ca    8.0<L>      28 Dec 2022 07:00    TPro  7.2  /  Alb  2.9<L>  /  TBili  0.4  /  DBili  x   /  AST  52<H>  /  ALT  99<H>  /  AlkPhos  132<H>  12-28            Review of Systems	      Objective     Physical Examination    heart s1s2  lung dec BS  head nc      Pertinent Lab findings & Imaging      Mai:  NO   Adequate UO     I&O's Detail           Discussed with:     Cultures:	        Radiology

## 2022-12-29 NOTE — DISCHARGE NOTE NURSING/CASE MANAGEMENT/SOCIAL WORK - PATIENT PORTAL LINK FT
You can access the FollowMyHealth Patient Portal offered by [facility] by registering at the following website: [email or web address]. By joining [employer]’s FollowMyHealth portal, you will also be able to view your health information using other applications (apps) compatible with our system.

## 2022-12-29 NOTE — DISCHARGE NOTE PROVIDER - CARE PROVIDER_API CALL
Jean-Pierre Watson)  Hematology; Internal Medicine; Medical Oncology  [address] [facility] [address], [place] [address]  Phone: [phone]  Fax: [phone]  Follow Up Time:

## 2022-12-29 NOTE — PROGRESS NOTE ADULT - SUBJECTIVE AND OBJECTIVE BOX
Patient is a 62y old  Male who presents with a chief complaint of fever (29 Dec 2022 06:52)    Patient seen in follow up for C[place].        PAST MEDICAL HISTORY:    MEDICATIONS  (STANDING):  heparin   Injectable 5000 Unit(s) SubCutaneous every 12 hours  influenza   Vaccine 0.5 milliLiter(s) IntraMuscular once  losartan 50 milliGRAM(s) Oral daily  pantoprazole    Tablet 40 milliGRAM(s) Oral before breakfast  sodium chloride 0.9%. 1000 milliLiter(s) (75 mL/Hr) IV Continuous <Continuous>    MEDICATIONS  (PRN):  acetaminophen     Tablet .. 650 milliGRAM(s) Oral every 6 hours PRN Mild Pain (1 - 3)  albuterol/ipratropium for Nebulization 3 milliLiter(s) Nebulizer every 6 hours PRN Bronchospasm  benzonatate 100 milliGRAM(s) Oral every 8 hours PRN Cough  guaiFENesin Oral Liquid (Sugar-Free) 100 milliGRAM(s) Oral every 6 hours PRN Cough  ibuprofen  Tablet. 400 milliGRAM(s) Oral every 8 hours PRN Mild Pain (1 - 3)  melatonin 5 milliGRAM(s) Oral at bedtime PRN Insomnia    T(C): 36.8 (12-29-22 @ 10:45), Max: 37.3 (12-29-22 @ 06:26)  HR: 77 (12-29-22 @ 10:45) (77 - 92)  BP: 136/75 (12-29-22 @ 10:45) (102/64 - 140/88)  RR: 16 (12-29-22 @ 10:45) (16 - 18)  SpO2: 95% (12-29-22 @ 10:45) (94% - 97%)  Wt(kg): --  I&O's Detail      PHYSICAL EXAM:  General: No distress  Respiratory: b/l air entry  Cardiovascular: S1 S2  Gastrointestinal: soft  Extremities:  no edema                              11.6   10.98 )-----------( 236      ( 28 Dec 2022 07:00 )             35.3     12-28    140  |  107  |  18  ----------------------------<  88  4.7   |  26  |  0.98    Ca    8.0<L>      28 Dec 2022 07:00    TPro  7.2  /  Alb  2.9<L>  /  TBili  0.4  /  DBili  x   /  AST  52<H>  /  ALT  99<H>  /  AlkPhos  132<H>  12-28        LIVER FUNCTIONS - ( 28 Dec 2022 07:00 )  Alb: 2.9 g/dL / Pro: 7.2 g/dL / ALK PHOS: 132 U/L / ALT: 99 U/L DA / AST: 52 U/L / GGT: x               Sodium, Serum: 140 (12-28 @ 07:00)  Sodium, Serum: 139 (12-27 @ 07:33)  Sodium, Serum: 137 (12-26 @ 11:34)    Creatinine, Serum: 0.98 (12-28 @ 07:00)  Creatinine, Serum: 0.89 (12-27 @ 07:33)  Creatinine, Serum: 1.39 (12-26 @ 11:34)    Potassium, Serum: 4.7 (12-28 @ 07:00)  Potassium, Serum: 4.8 (12-27 @ 07:33)  Potassium, Serum: 4.5 (12-26 @ 11:34)    Hemoglobin: 11.6 (12-28 @ 07:00)  Hemoglobin: 11.4 (12-27 @ 07:33)  Hemoglobin: 13.2 (12-26 @ 11:34)

## 2022-12-29 NOTE — PROGRESS NOTE ADULT - ASSESSMENT
62 yr old with PMH of HTN ,h/o appendectomy presenting with fevers, body aches, diarrhea, hasf 6 days of fever (T-max 102) headache body aches coughing status post trip to [place].  Patient relates went to urgent care 3 days ago had a negative flu and COVID rapid test but was given Tamiflu anyway in case he still had the flu despite the negative test.  Patient developed nausea and some vomiting after starting Tamiflu he called his doctor yesterday they started him on doxycycline in case he had a bacterial infection.  No sore throat runny nose abdominal pain chest pain short of breath urinary complaints rash neck stiffness or photophobia. Last Tylenol approximately 1.5 hours prior to arrival  .PMD Attard   In ED febrile, with tachycadia, tachypnea, no leucocytosis, has lymphopenia,elevated LFT( as per patient, FT are chronically high, no cause found), had US abd -diffuse GB edema and periportal adenopathy,seen by surg- has elevated bun creat , had CT abd chest-< from: CT Chest w/ IV Cont (12.26.22 @ 17:01) >  Diffuse thoracic and abdominopelvic lymphadenopathy. Mild splenomegaly.   Consideration is given to a infectious/inflammatory process versus a   neoplastic process such as lymphoproliferative disease.  Mild hepatomegaly. Periportal edema, a nonspecific finding that may be   seen with hepatitis or aggressive fluid resuscitation. Correlate with   liver function tests.  Underdistended gallbladder. Edematous gallbladder wall thickening, also   nonspecific and may be related to underlying liver pathology.  Gastric wall thickening versus underdistention, possibly representing   gastritis. Consider correlation with upper endoscopy to assess for   underlying neoplasm.  A few small nonspecific pulmonary nodules. Continued follow-up is   recommended.  Small right and trace left pleural effusions.  Small volume ascites.  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

## 2022-12-29 NOTE — PROGRESS NOTE ADULT - ASSESSMENT
63 y/o man with PMH of HTN ,h/o appendectomy. Recently visited family in [place], reports ate out last Wed, dev hives, returned to  on Thurs, dev fever uo to 102. Also w malaises poor po, cough w clear fluid. Was seen in Urgent Care Friday, COVID and Flu tests negative but given Temaflu, also took antibiotics called in by covering PMD and had diarrhea(resolved), then came to ER  CT c/a/p-above and below diaphragm LADs, including axillary, hilar, precarinal, portal caval, gastrohepatic, paraarotic(largest 4.3x2.2cm portocaval, also hepatosplenomegaly spleen 14.8cm liver 18.1cm. Small subcentimeter pulm nodules. Small right and trace left effusion. subcentimeter hypodense liver lesions    -pt w nonspecific malaise x few weeks, ~3 days of fever. CT c/a/p w mild hepatosplenomegaly, above and below diaphragm LADs up to 4+cm protocaval  -adm labs also w mild elevated LFT that has improved  -seen by Pulm, GI, Surgery    EBV IgG abs positive.   ? old infeciton vs reactivation    -suspicion for malignancy, needs tissue dx, best would be LN excisional bx(left axillar was noted as 2/7cm on CT but not palpable by me)    Fever defervescing  Rash resolved lasts week.     RECOMMENDATION:     US report noted  discussed with patient and wife  patient oncologically stable for discharge  will be seen in the office by Dr. Walter and arrangements made for LN biopsy

## 2022-12-29 NOTE — DISCHARGE NOTE PROVIDER - NSDCCPCAREPLAN_GEN_ALL_CORE_FT
PRINCIPAL DISCHARGE DIAGNOSIS  Diagnosis: Fever  Assessment and Plan of Treatment: resolved      SECONDARY DISCHARGE DIAGNOSES  Diagnosis: Diffuse lymphadenopathy  Assessment and Plan of Treatment: out pt biospsy

## 2022-12-29 NOTE — PROGRESS NOTE ADULT - ASSESSMENT
62 yr old with PMH of HTN ,h/o appendectomy presenting with fevers, body aches, diarrhea, hasf 6 days of fever (T-max 102) headache body aches coughing status post trip to [place].     small pleural eff  diffuse lymphadenopathy  atelectasis  pulm nodules - sub cm - in a non smoker -   HTN  OA  fever - aches - diarrhea - dyspepsia -     axilla - LN noted - may need IR Bx  Heme follow up  serology noted  ID follow up reviewed    CT chest and abd noted  cvs rx regimen and BP control  biomarkers  serial ABD exam

## 2022-12-29 NOTE — PROGRESS NOTE ADULT - PROVIDER SPECIALTY LIST ADULT
Nephrology
Surgery
Infectious Disease
Internal Medicine
Pulmonology
Heme/Onc
Heme/Onc
Internal Medicine
Pulmonology
Pulmonology
Surgery
Gastroenterology
Infectious Disease
Internal Medicine

## 2022-12-29 NOTE — PROGRESS NOTE ADULT - ASSESSMENT
Patient is a 62 year old male with PMH of HTN, h/o appendectomy who presented with complaint of fever, chills, headache. Patient felt unwell with headache prior to going to [place]. In [place], after going to a pub and having tilapia tacos - he developed a diffuse body rash overnight which resolved with benadryl. He has not had any fish allergies before but this was a new fish. He then started to have fevers the following night with chills and continued headache, Tm 102F. He went to  Friday and tested negative for flu and COVID but was febrile so started on tamiflu but felt worse after. His covering PMD started him on doxycycline and reports having watery diarrhea and abdominal distention; diarrhea has since resolved but still feels abdomen is distended. No nausea, vomiting, abdominal pain.     Fever with lymphopenia, eosinophilia, transaminitis, [place]   with CT imaging showing diffuse lymphadenopathy, mild hepatosplenomegaly, periportal edema, gall bladder wall thickening, few small nonspecific pulm nodules and small -trace pleural effusions, small ascites  --unclear etiology ?viral ?recent or reactivated EBV vs neoplastic     Rash -- had twice -- resolved with antihistamine each time; no further rash  - afebrile since 12/26 pm  - lymphopenia, eosinophilia resolved, LFTs downtrending  - mildly elevated WBC with no neutrophilia-stable   - COVID/RSV/Flu negative   - UA negative for pyuria   - blood cultures NGTD x2  - hepatitis panel negative   - CMV serology negative  - EBV serology c/w either reactivation vs recent infection   Recommendations:   -- Heme/Onc following - plan for LN biopsy   -- if no plan for inpatient, bx strongly recommend bx within next few weeks  -- GI, Surgery following, appreciate recs  -- continue to monitor off antibiotics  -- monitor temps/CBC, CMP    D/w Dr. Velez    Infectious Diseases will continue to follow. Please call with any questions.   Keila Newman M.D.  [facility] Division of Infectious Diseases [phone]   Patient is a 62 year old male with PMH of HTN, h/o appendectomy who presented with complaint of fever, chills, headache. Patient felt unwell with headache prior to going to [place]. In [place], after going to a pub and having tilapia tacos - he developed a diffuse body rash overnight which resolved with benadryl. He has not had any fish allergies before but this was a new fish. He then started to have fevers the following night with chills and continued headache, Tm 102F. He went to  Friday and tested negative for flu and COVID but was febrile so started on tamiflu but felt worse after. His covering PMD started him on doxycycline and reports having watery diarrhea and abdominal distention; diarrhea has since resolved but still feels abdomen is distended. No nausea, vomiting, abdominal pain.     Fever with lymphopenia, eosinophilia, transaminitis, [place]   with CT imaging showing diffuse lymphadenopathy, mild hepatosplenomegaly, periportal edema, gall bladder wall thickening, few small nonspecific pulm nodules and small -trace pleural effusions, small ascites  --unclear etiology ?viral ?recent or reactivated EBV vs neoplastic     Rash -- had twice -- resolved with antihistamine each time; no further rash  - afebrile since 12/26 pm  - lymphopenia, eosinophilia resolved, LFTs downtrending  - mildly elevated WBC with no neutrophilia-stable   - COVID/RSV/Flu negative   - UA negative for pyuria   - blood cultures NGTD x2  - hepatitis panel negative   - CMV serology negative  - EBV serology c/w either reactivation vs recent infection   Recommendations:   -- Heme/Onc following - plan for LN biopsy   -- if no plan for inpatient, bx strongly recommend bx within next few weeks  -- GI, Surgery following, appreciate recs  -- continue to monitor off antibiotics  -- monitor temps/CBC, CMP    Dx likely Glandular Fever vs. Acute onset lymphoma    D/w Dr. Velez    Infectious Diseases will continue to follow. Please call with any questions.   Keila Newman M.D.  \[facility]\"" Division of Infectious Diseases [phone]

## 2022-12-31 LAB
CULTURE RESULTS: SIGNIFICANT CHANGE UP
CULTURE RESULTS: SIGNIFICANT CHANGE UP
INTERPRETATION SERPL IFE-IMP: SIGNIFICANT CHANGE UP
SPECIMEN SOURCE: SIGNIFICANT CHANGE UP
SPECIMEN SOURCE: SIGNIFICANT CHANGE UP